# Patient Record
Sex: MALE | Race: WHITE
[De-identification: names, ages, dates, MRNs, and addresses within clinical notes are randomized per-mention and may not be internally consistent; named-entity substitution may affect disease eponyms.]

---

## 2020-11-15 ENCOUNTER — HOSPITAL ENCOUNTER (INPATIENT)
Dept: HOSPITAL 72 - EMR | Age: 40
LOS: 4 days | Discharge: LEFT BEFORE DISCHARGE | DRG: 720 | End: 2020-11-19
Payer: MEDICAID

## 2020-11-15 VITALS — DIASTOLIC BLOOD PRESSURE: 77 MMHG | SYSTOLIC BLOOD PRESSURE: 131 MMHG

## 2020-11-15 VITALS — HEIGHT: 72 IN | WEIGHT: 205 LBS | BODY MASS INDEX: 27.77 KG/M2

## 2020-11-15 VITALS — SYSTOLIC BLOOD PRESSURE: 121 MMHG | DIASTOLIC BLOOD PRESSURE: 79 MMHG

## 2020-11-15 VITALS — SYSTOLIC BLOOD PRESSURE: 127 MMHG | DIASTOLIC BLOOD PRESSURE: 83 MMHG

## 2020-11-15 VITALS — SYSTOLIC BLOOD PRESSURE: 129 MMHG | DIASTOLIC BLOOD PRESSURE: 76 MMHG

## 2020-11-15 VITALS — SYSTOLIC BLOOD PRESSURE: 143 MMHG | DIASTOLIC BLOOD PRESSURE: 85 MMHG

## 2020-11-15 VITALS — DIASTOLIC BLOOD PRESSURE: 81 MMHG | SYSTOLIC BLOOD PRESSURE: 126 MMHG

## 2020-11-15 VITALS — SYSTOLIC BLOOD PRESSURE: 124 MMHG | DIASTOLIC BLOOD PRESSURE: 80 MMHG

## 2020-11-15 DIAGNOSIS — E87.1: ICD-10-CM

## 2020-11-15 DIAGNOSIS — A41.9: Primary | ICD-10-CM

## 2020-11-15 DIAGNOSIS — Z59.0: ICD-10-CM

## 2020-11-15 DIAGNOSIS — L03.313: ICD-10-CM

## 2020-11-15 DIAGNOSIS — L02.11: ICD-10-CM

## 2020-11-15 DIAGNOSIS — F15.10: ICD-10-CM

## 2020-11-15 DIAGNOSIS — L03.114: ICD-10-CM

## 2020-11-15 DIAGNOSIS — J43.9: ICD-10-CM

## 2020-11-15 LAB
ADD MANUAL DIFF: NO
ALBUMIN SERPL-MCNC: 3.5 G/DL (ref 3.4–5)
ALBUMIN/GLOB SERPL: 0.9 {RATIO} (ref 1–2.7)
ALP SERPL-CCNC: 126 U/L (ref 46–116)
ALT SERPL-CCNC: 30 U/L (ref 12–78)
ANION GAP SERPL CALC-SCNC: 7 MMOL/L (ref 5–15)
AST SERPL-CCNC: 17 U/L (ref 15–37)
BASOPHILS NFR BLD AUTO: 0.6 % (ref 0–2)
BILIRUB SERPL-MCNC: 0.2 MG/DL (ref 0.2–1)
BUN SERPL-MCNC: 10 MG/DL (ref 7–18)
CALCIUM SERPL-MCNC: 8.5 MG/DL (ref 8.5–10.1)
CHLORIDE SERPL-SCNC: 99 MMOL/L (ref 98–107)
CK MB SERPL-MCNC: 3.7 NG/ML (ref 0–3.6)
CO2 SERPL-SCNC: 27 MMOL/L (ref 21–32)
CREAT SERPL-MCNC: 0.9 MG/DL (ref 0.55–1.3)
EOSINOPHIL NFR BLD AUTO: 1.5 % (ref 0–3)
ERYTHROCYTE [DISTWIDTH] IN BLOOD BY AUTOMATED COUNT: 12.4 % (ref 11.6–14.8)
GLOBULIN SER-MCNC: 3.9 G/DL
HCT VFR BLD CALC: 44.9 % (ref 42–52)
HGB BLD-MCNC: 15.8 G/DL (ref 14.2–18)
LYMPHOCYTES NFR BLD AUTO: 10.2 % (ref 20–45)
MCV RBC AUTO: 93 FL (ref 80–99)
MONOCYTES NFR BLD AUTO: 7.4 % (ref 1–10)
NEUTROPHILS NFR BLD AUTO: 80.3 % (ref 45–75)
PLATELET # BLD: 315 K/UL (ref 150–450)
POTASSIUM SERPL-SCNC: 3.7 MMOL/L (ref 3.5–5.1)
RBC # BLD AUTO: 4.86 M/UL (ref 4.7–6.1)
SODIUM SERPL-SCNC: 133 MMOL/L (ref 136–145)
WBC # BLD AUTO: 17.8 K/UL (ref 4.8–10.8)

## 2020-11-15 PROCEDURE — 96376 TX/PRO/DX INJ SAME DRUG ADON: CPT

## 2020-11-15 PROCEDURE — 96375 TX/PRO/DX INJ NEW DRUG ADDON: CPT

## 2020-11-15 PROCEDURE — 87081 CULTURE SCREEN ONLY: CPT

## 2020-11-15 PROCEDURE — 80202 ASSAY OF VANCOMYCIN: CPT

## 2020-11-15 PROCEDURE — 80053 COMPREHEN METABOLIC PANEL: CPT

## 2020-11-15 PROCEDURE — 70490 CT SOFT TISSUE NECK W/O DYE: CPT

## 2020-11-15 PROCEDURE — 83735 ASSAY OF MAGNESIUM: CPT

## 2020-11-15 PROCEDURE — 83605 ASSAY OF LACTIC ACID: CPT

## 2020-11-15 PROCEDURE — 71045 X-RAY EXAM CHEST 1 VIEW: CPT

## 2020-11-15 PROCEDURE — 87040 BLOOD CULTURE FOR BACTERIA: CPT

## 2020-11-15 PROCEDURE — 99285 EMERGENCY DEPT VISIT HI MDM: CPT

## 2020-11-15 PROCEDURE — 96365 THER/PROPH/DIAG IV INF INIT: CPT

## 2020-11-15 PROCEDURE — 85025 COMPLETE CBC W/AUTO DIFF WBC: CPT

## 2020-11-15 PROCEDURE — 87181 SC STD AGAR DILUTION PER AGT: CPT

## 2020-11-15 PROCEDURE — 36415 COLL VENOUS BLD VENIPUNCTURE: CPT

## 2020-11-15 PROCEDURE — 87070 CULTURE OTHR SPECIMN AEROBIC: CPT

## 2020-11-15 PROCEDURE — 87205 SMEAR GRAM STAIN: CPT

## 2020-11-15 PROCEDURE — 82553 CREATINE MB FRACTION: CPT

## 2020-11-15 RX ADMIN — HEPARIN SODIUM SCH UNITS: 5000 INJECTION INTRAVENOUS; SUBCUTANEOUS at 08:30

## 2020-11-15 RX ADMIN — HYDROCODONE BITARTRATE AND ACETAMINOPHEN PRN TAB: 10; 325 TABLET ORAL at 21:13

## 2020-11-15 RX ADMIN — HEPARIN SODIUM SCH UNITS: 5000 INJECTION INTRAVENOUS; SUBCUTANEOUS at 21:12

## 2020-11-15 SDOH — ECONOMIC STABILITY - HOUSING INSECURITY: HOMELESSNESS: Z59.0

## 2020-11-15 NOTE — DIAGNOSTIC IMAGING REPORT
EXAM:

  XR Left Hand Complete, 3 or More Views

 

CLINICAL HISTORY:

  PAIN

 

TECHNIQUE:

  Frontal, lateral and oblique views of the left hand.

 

COMPARISON:

  No relevant prior studies available.

 

FINDINGS:

  Bones/joints:  No acute fracture.  No dislocation.

  Soft tissues:  There is soft tissue edema.  No radiopaque foreign body.

 

IMPRESSION:     

1.  No acute fracture.

2.  Soft tissue edema.

## 2020-11-15 NOTE — HISTORY AND PHYSICAL REPORT
DATE OF ADMISSION:  11/15/2020

CHIEF COMPLAINT:  Neck abscess.



HISTORY OF PRESENT ILLNESS:  This is a 40-year-old male with no past

medical history presented with complaints of two weeks of a small abscess

on the upper sternum area.  He denies any fevers or chills.  No shortness

of breath but his infection worsened.  He is apparently homeless, has been

unable to get any medical care until now.



PAST MEDICAL HISTORY:  None.



PAST SURGICAL HISTORY:  None.



CURRENT MEDICATIONS:  None.



FAMILY HISTORY:  None.



SOCIAL HISTORY:  The patient denies any tobacco, ethanol, or drugs.



REVIEW OF SYSTEMS:  Unremarkable.



PHYSICAL EXAMINATION:

VITAL SIGNS:  Temperature 98, pulse 89, respirations 18, and blood pressure

143/104.  There is a 5 cm area of induration and redness on the upper

sternum area.



LABORATORY DATA:  White count was 17,000.



ASSESSMENT:  This is a 40-year-old male admitted with complaints of

cellulitis versus an abscess on the upper sternum.



PLAN:  CT scan of the neck/chest, IV antibiotics, followup cultures.  The

patient may need an I and D.









  ______________________________________________

  Thomas Iraheta M.D.





DR:  [place]

D:  11/15/2020 07:45

T:  11/15/2020 08:31

JOB#:  [id number]

CC:

## 2020-11-15 NOTE — DIAGNOSTIC IMAGING REPORT
EXAM:

  XR Chest, 1 View

 

CLINICAL HISTORY:

  COUGH

 

TECHNIQUE:

  Frontal view of the chest.

 

COMPARISON:

  No relevant prior studies available.

 

FINDINGS:

  Lungs:  Unremarkable.  No consolidation.

  Pleural space:  Unremarkable.  No pneumothorax.

  Heart:  Unremarkable.  No cardiomegaly.

  Mediastinum:  Unremarkable.

  Bones/joints:  Unremarkable.

 

IMPRESSION:     

  No radiographic evidence of acute cardiopulmonary disease.

## 2020-11-15 NOTE — EMERGENCY ROOM REPORT
History of Present Illness


General


Chief Complaint:  General Complaint


Source:  Patient





Present Illness


HPI


40-year-old male, homeless, here for evaluation for skin infections.  Patient 

says that he frequently gets skin infections and admits to using 

methamphetamine.  However over the past several days he has been experiencing 

worsening pain and drainage and redness of the skin around these areas of 

infection.  The worst of the infection are on his chest and on his left hand.  

He is also been experiencing worsening pain and swelling of his left hand.  Says

"I feel sick" which he describes as generalized malaise.  Denies fevers, chills,

shortness of breath, palpitations, back pain, abdominal pain, nausea, vomiting, 

dysuria.  Said he had 1 episode of diarrhea today.  Denies IV drug use.


Allergies:  


Coded Allergies:  


     No Known Allergies (Unverified , 11/15/20)





COVID-19 Screening


Contact w/high risk pt:  No


Experienced COVID-19 symptoms?:  No


COVID-19 Testing performed PTA:  No





Nursing Documentation-[facility]


Past Medical History:  No Stated History





Review of Systems


All Other Systems:  negative except mentioned in HPI





Physical Exam





Vital Signs








  Date Time  Temp Pulse Resp B/P (MAP) Pulse Ox O2 Delivery O2 Flow Rate FiO2


 


11/15/20 01:53 97.9 101 18 126/81 (96) 99 Room Air  








Sp02 EP Interpretation:  reviewed, normal


General Appearance:  no apparent distress, GCS 15, non-toxic, other - 

Disheveled, foul-smelling


Head:  normocephalic, atraumatic


Eyes:  bilateral eye normal inspection, bilateral eye PERRL


ENT:  hearing grossly normal, normal pharynx, no angioedema, normal voice


Neck:  full range of motion, supple/symm/no masses


Respiratory:  chest non-tender, lungs clear, normal breath sounds, speaking full

sentences


Cardiovascular #1:  regular rate, rhythm, no edema


Cardiovascular #2:  2+ carotid (R), 2+ carotid (L), 2+ radial (R), 2+ radial 

(L), 2+ dorsalis pedis (R), 2+ dorsalis pedis (L)


Gastrointestinal:  normal bowel sounds, non tender, soft, non-distended, no 

guarding, no rebound


Rectal:  deferred


Genitourinary:  normal inspection, no CVA tenderness


Musculoskeletal:  back normal, normal range of motion, gait/station normal, 

other - Diffuse swelling of the left hand on the extensor surface of the left 

palm


Neurologic:  alert, motor strength/tone normal, oriented x3, sensory intact, 

responsive, speech normal


Psychiatric:  judgement/insight normal, memory normal, mood/affect normal, no 

suicidal/homicidal ideation


Skin:  other - Multiple open lesions of the extensor surface of the left hand 

and a very large 3 cm open sore on the right upper chest with approximately 4 cm

of extensive warm painful erythema extending from the wound.


Lymphatic:  no adenopathy





Medical Decision Making


Diagnostic Impression:  


   Primary Impression:  


   Sepsis


   Additional Impressions:  


   Cellulitis


   Drug abuse


   Homeless


ER Course





Laboratory Tests








Test


 11/15/20


02:20


 


White Blood Count


 17.8 K/UL


(4.8-10.8)  H


 


Red Blood Count


 4.86 M/UL


(4.70-6.10)


 


Hemoglobin


 15.8 G/DL


(14.2-18.0)


 


Hematocrit


 44.9 %


(42.0-52.0)


 


Mean Corpuscular Volume 93 FL (80-99)  


 


Mean Corpuscular Hemoglobin


 32.5 PG


(27.0-31.0)  H


 


Mean Corpuscular Hemoglobin


Concent 35.1 G/DL


(32.0-36.0)


 


Red Cell Distribution Width


 12.4 %


(11.6-14.8)


 


Platelet Count


 315 K/UL


(150-450)


 


Mean Platelet Volume


 8.0 FL


(6.5-10.1)


 


Neutrophils (%) (Auto)


 80.3 %


(45.0-75.0)  H


 


Lymphocytes (%) (Auto)


 10.2 %


(20.0-45.0)  L


 


Monocytes (%) (Auto)


 7.4 %


(1.0-10.0)


 


Eosinophils (%) (Auto)


 1.5 %


(0.0-3.0)


 


Basophils (%) (Auto)


 0.6 %


(0.0-2.0)


 


Sodium Level


 133 MMOL/L


(136-145)  L


 


Potassium Level


 3.7 MMOL/L


(3.5-5.1)


 


Chloride Level


 99 MMOL/L


()


 


Carbon Dioxide Level


 27 MMOL/L


(21-32)


 


Anion Gap


 7 mmol/L


(5-15)


 


Blood Urea Nitrogen


 10 mg/dL


(7-18)


 


Creatinine


 0.9 MG/DL


(0.55-1.30)


 


Estimated Glomerular


Filtration Rate > 60 mL/min


(>60)


 


Glucose Level


 117 MG/DL


()  H


 


Lactic Acid Level


 1.40 mmol/L


(0.4-2.0)


 


Calcium Level


 8.5 MG/DL


(8.5-10.1)


 


Magnesium Level


 2.0 MG/DL


(1.8-2.4)


 


Total Bilirubin


 0.2 MG/DL


(0.2-1.0)


 


Aspartate Amino Transferase


(AST) 17 U/L (15-37)





 


Alanine Aminotransferase (ALT)


 30 U/L (12-78)





 


Alkaline Phosphatase


 126 U/L


()  H


 


Creatine Kinase MB


 3.7 NG/ML


(0.0-3.6)  H


 


Total Protein


 7.4 G/DL


(6.4-8.2)


 


Albumin


 3.5 G/DL


(3.4-5.0)


 


Globulin 3.9 g/dL  


 


Albumin/Globulin Ratio


 0.9 (1.0-2.7)


L


 


Salicylates Level


 2.5 ug/mL


(2.8-20)  L


 


Acetaminophen Level


 < 2 MCG/ML


(10-30)  L


 


Serum Alcohol < 3 mg/dL  











Chest x-ray: No infiltrate/effusion. Mediastinum within normal limits.  No 

consolidation


X-ray left hand: Soft tissue edema.  No acute bony abnormalities.  No 

osteomyelitis.  No fracture or dislocation





40-year-old male, homeless, history of drug abuse, here with open skin lesions 

and cellulitis.  Patient has cellulitis on physical examination worse in his r

ight upper chest and left hand.  He also had swelling diffusely of his left hand

without any involvement of the digits.  No worry at this time of flexor 

tenosynovitis.  Patient normal range of motion of his fingers and his symptoms 

seem to be localized to the extensor surface of the left palm.  X-ray was 

unremarkable.  No evidence of cellulitis.  Chest x-ray normal.  CBC showed a l

eukocytosis of 18,000.  Patient was also tachycardic on arrival to the emergency

department with heart rate of 101.  He was given 1 L of IV normal saline with 

resolution of his tachycardia.  Lactate negative.  Patient was given vancomycin 

in the emergency department.  Admitted to [facility].





Last Vital Signs








  Date Time  Temp Pulse Resp B/P (MAP) Pulse Ox O2 Delivery O2 Flow Rate FiO2


 


11/15/20 01:53 97.9 101 18 126/81 (96) 99 Room Air  

















Sean Trejo M.D.                Nov 15, 2020 02:16

## 2020-11-16 VITALS — SYSTOLIC BLOOD PRESSURE: 112 MMHG | DIASTOLIC BLOOD PRESSURE: 67 MMHG

## 2020-11-16 VITALS — SYSTOLIC BLOOD PRESSURE: 138 MMHG | DIASTOLIC BLOOD PRESSURE: 73 MMHG

## 2020-11-16 VITALS — DIASTOLIC BLOOD PRESSURE: 78 MMHG | SYSTOLIC BLOOD PRESSURE: 144 MMHG

## 2020-11-16 VITALS — SYSTOLIC BLOOD PRESSURE: 128 MMHG | DIASTOLIC BLOOD PRESSURE: 74 MMHG

## 2020-11-16 VITALS — SYSTOLIC BLOOD PRESSURE: 132 MMHG | DIASTOLIC BLOOD PRESSURE: 79 MMHG

## 2020-11-16 VITALS — DIASTOLIC BLOOD PRESSURE: 83 MMHG | SYSTOLIC BLOOD PRESSURE: 131 MMHG

## 2020-11-16 RX ADMIN — HEPARIN SODIUM SCH UNITS: 5000 INJECTION INTRAVENOUS; SUBCUTANEOUS at 20:59

## 2020-11-16 RX ADMIN — HEPARIN SODIUM SCH UNITS: 5000 INJECTION INTRAVENOUS; SUBCUTANEOUS at 08:46

## 2020-11-16 RX ADMIN — HYDROCODONE BITARTRATE AND ACETAMINOPHEN PRN TAB: 10; 325 TABLET ORAL at 16:59

## 2020-11-16 NOTE — GENERAL PROGRESS NOTE
Subjective


ROS Limited/Unobtainable:  No


Constitutional:  Reports: no symptoms


HEENT:  Reports: no symptoms


Cardiovascular:  Reports: no symptoms


Respiratory:  Reports: no symptoms


Gastrointestinal/Abdominal:  Reports: no symptoms


Genitourinary:  Reports: no symptoms


Neurologic/Psychiatric:  Reports: no symptoms


Endocrine:  Reports: no symptoms


Hematologic/Lymphatic:  Reports: no symptoms


Allergies:  


Coded Allergies:  


     No Known Allergies (Unverified , 11/15/20)


All Systems:  reviewed and negative except above


Subjective


no new complaints. on iv abx. ct not done yet





Objective





Last 24 Hour Vital Signs








  Date Time  Temp Pulse Resp B/P (MAP) Pulse Ox O2 Delivery O2 Flow Rate FiO2


 


11/16/20 09:00      Room Air  


 


11/16/20 08:00 98.1 90 18 144/78 (100) 99   


 


11/16/20 04:00 97.8 79 18 128/74 (92) 98   


 


11/16/20 00:00 97.6 74 19 132/79 (96) 97   


 


11/15/20 21:00      Room Air  


 


11/15/20 20:00 98.4 76 17 124/80 (95) 96   


 


11/15/20 16:00 98.1 79 17 127/83 (98) 98   


 


11/15/20 12:00 97.3 76 18 121/79 (93) 98   

















Intake and Output  


 


 11/15/20 11/16/20





 19:00 07:00


 


Intake Total 600 ml 740 ml


 


Balance 600 ml 740 ml


 


  


 


Intake Oral  740 ml


 


Other 600 ml 


 


# Voids  3








Laboratory Tests


11/15/20 22:55: Vancomycin Level Trough 10.0


Height (Feet):  6


Height (Inches):  0.00


Weight (Pounds):  205


Objective


4cm induration on upper sternum





Assessment/Plan


Problem List:  


(1) Cellulitis


ICD Codes:  L03.90 - Cellulitis, unspecified


SNOMED:  200383378


Status:  stable


Assessment/Plan:


ct pending


iv abx


wound care


pain rx











Thomas Iraheta MD              Nov 16, 2020 11:34

## 2020-11-16 NOTE — DIAGNOSTIC IMAGING REPORT
Indication: Neck swelling and pain

 

Technique: CT of the neck was performed utilizing automated exposure control without

intravenous contrast material. Axial and coronal images were generated.

 

CT dose: Total .6 mGycm; CTDI vol 14.6 mGy

 

Comparison: None available

 

Findings: 

 

Skull base: There is a small left mastoid effusion. Small left maxillary sinus mucus

retention cyst.

 

Airways: Widely patent.

 

Suprahyoid neck: There are calcifications in the palatine tonsils bilaterally, which

are prominent.

 

Infrahyoid neck: Thyroid gland is unremarkable.

 

Upper chest: Lung apices demonstrate mild to moderate emphysema. There is mild

diffuse bronchial thickening.

 

Bones and soft tissues: There are multilevel discogenic degenerative changes of the

visualized spine. In the right anterior supraclavicular soft tissues, there is

moderate focal soft tissue swelling with moderate fat stranding extending to the soft

tissues overlying the right sternoclavicular joint. No evidence of osseous erosion.

 

Impression:

 

1.  Inflammatory changes of the right supraclavicular and parasternal soft tissues

suggestive of cellulitis. No drainable fluid collection identified, though evaluation

is limited in the absence of intravenous contrast.

2.  Prominent, calcified palatine tonsils presumably secondary to prior

infection/inflammation.

3.  Mild to moderate emphysema, incompletely imaged.

4.  Small left mastoid effusion.

 

The CT scanner at [facility] is accredited by the [employer]

[employer]iology and the scans are performed using protocols designed to limit radiation

exposure to as low as reasonably achievable to attain images of sufficient resolution

adequate for diagnostic evaluation.

## 2020-11-17 VITALS — SYSTOLIC BLOOD PRESSURE: 110 MMHG | DIASTOLIC BLOOD PRESSURE: 56 MMHG

## 2020-11-17 VITALS — DIASTOLIC BLOOD PRESSURE: 67 MMHG | SYSTOLIC BLOOD PRESSURE: 122 MMHG

## 2020-11-17 VITALS — DIASTOLIC BLOOD PRESSURE: 70 MMHG | SYSTOLIC BLOOD PRESSURE: 121 MMHG

## 2020-11-17 VITALS — DIASTOLIC BLOOD PRESSURE: 62 MMHG | SYSTOLIC BLOOD PRESSURE: 112 MMHG

## 2020-11-17 VITALS — DIASTOLIC BLOOD PRESSURE: 50 MMHG | SYSTOLIC BLOOD PRESSURE: 96 MMHG

## 2020-11-17 RX ADMIN — HYDROCODONE BITARTRATE AND ACETAMINOPHEN PRN TAB: 10; 325 TABLET ORAL at 08:37

## 2020-11-17 RX ADMIN — HEPARIN SODIUM SCH UNITS: 5000 INJECTION INTRAVENOUS; SUBCUTANEOUS at 21:07

## 2020-11-17 RX ADMIN — HEPARIN SODIUM SCH UNITS: 5000 INJECTION INTRAVENOUS; SUBCUTANEOUS at 08:21

## 2020-11-17 RX ADMIN — HYDROCODONE BITARTRATE AND ACETAMINOPHEN PRN TAB: 10; 325 TABLET ORAL at 12:46

## 2020-11-17 NOTE — CONSULTATION
History of Present Illness


General


Date patient seen:  Nov 17, 2020


Reason for Hospitalization:  General Complaint





Present Illness


HPI


This is a 40-year-old homeless male who presented to [facility] for evaluation of 

worsening skin infections.  Patient says that he frequently gets skin infections

and admits to using methamphetamine.  However over the past several days he has 

been experiencing worsening pain and drainage and redness of the skin around 

these areas of infection.  The worst of the infection are on his chest and on 

his left hand.  He is also been experiencing worsening pain and swelling of his 

left hand.  Says "I feel sick" which he describes as generalized malaise.  

Denies fevers, chills, shortness of breath, palpitations, back pain, abdominal 

pain, nausea, vomiting, dysuria.  Said he had 1 episode of diarrhea today.  

Denies IV drug use.  surgery called to evaluate and assist with care.


Allergies:  


Coded Allergies:  


     No Known Allergies (Unverified , 11/15/20)





COVID-19 Screening


Contact w/high risk pt:  No


Experienced COVID-19 symptoms?:  No





Medication History


No Active Prescriptions or Reported Meds





Patient History


History Provided By:  Patient, Medical Record, PMD


Healthcare decision maker





Resuscitation status





Advanced Directive on File








Past Medical/Surgical History


Past Medical/Surgical History:  


(1) Drug abuse


(2) Sepsis


(3) Homeless


(4) Cellulitis





Review of Systems


Review of Symptoms


General ROS: no weight loss or fever


Psychological ROS: no depression or mood changes, no memory loss


Ophthalmic ROS: no visual changes or eye irritation


ENT ROS: no nasal congestion, hearing loss, dizziness


Allergy and Immunology ROS: no allergic symptoms or urticaria


Hematological and Lymphatic ROS: no swollen glands, unusual bleeding or bruising


Endocrine ROS: no polyuria, polydipsia, weight changes, temperature intolerance


Respiratory ROS: no cough, shortness of breath, or wheezing


Cardiovascular ROS: no chest pain or dyspnea on exertion


Gastrointestinal ROS: denies abdominal pain, bright red blood in stool.


Musculoskeletal ROS: no myalgias or arthralgias


Neurological ROS: no TIA or stroke symptoms


Dermatological ROS: no new or changing skin lesions, rashes or pruritis





Physical Exam


Physical Exam


General appearance:  alert, cooperative, no distress, appears stated age


Head:  Normocephalic, without obvious abnormality, atraumatic


Eyes:  conjunctivae/corneas clear. PERRL, EOM's intact. Fundi benign


Throat:  Lips, mucosa, and tongue normal. Teeth and gums normal


Neck:  supple, symmetrical, trachea midline, no adenopathy, thyroid: not 

enlarged, symmetric, no tenderness/mass/nodules, no carotid bruit and no JVD


Lungs:  clear to auscultation bilaterally


Heart:  regular rate and rhythm, S1, S2 normal, no murmur, click, rub or gallop


Abdomen:  soft, non-tender. Bowel sounds normal. No masses,  no organomegaly


Extremities:  extremities normal, atraumatic, no cyanosis or edema


Pulses:  2+ and symmetric


Skin:  Skin see below 


Neurologic:  Grossly normal





Last 24 Hour Vital Signs








  Date Time  Temp Pulse Resp B/P (MAP) Pulse Ox O2 Delivery O2 Flow Rate FiO2


 


11/17/20 12:00 98.0 77 16 110/56 (74) 22   


 


11/17/20 09:00      Room Air  


 


11/17/20 08:00 98.0 75 16 122/67 (85) 20   


 


11/17/20 04:09 98.0 88 16 121/70 (87) 96   


 


11/16/20 20:01      Room Air  


 


11/16/20 19:46 97.6 90 18 112/67 (82) 94   


 


11/16/20 16:00 97.1 87 18 131/83 (99) 96   

















Intake and Output  


 


 11/16/20 11/17/20





 19:00 07:00


 


Intake Total 1250 ml 770.000 ml


 


Balance 1250 ml 770.000 ml


 


  


 


Intake Oral 650 ml 520 ml


 


IV Total  250.000 ml


 


Other 600 ml 


 


# Voids 3 3








Height (Feet):  6


Height (Inches):  0.00


Weight (Pounds):  205


Medications





Current Medications








 Medications


  (Trade)  Dose


 Ordered  Sig/Tameka


 Route


 PRN Reason  Start Time


 Stop Time Status Last Admin


Dose Admin


 


 Acetaminophen


  (Tylenol)  650 mg  Q4H  PRN


 ORAL


 Mild Pain 1-3/fever  11/15/20 05:45


 12/15/20 05:44   





 


 Acetaminophen/


 Hydrocodone Bitart


  (Norco 10/325)  1 tab  Q4H  PRN


 ORAL


 For Pain 4-10  11/15/20 05:45


 11/22/20 05:44  11/17/20 12:46





 


 Heparin Sodium


  (Porcine)


  (Heparin 5000


 units/ml)  5,000 units  EVERY 12  HOURS


 SUBQ


   11/15/20 09:00


 12/30/20 08:59  11/17/20 08:21





 


 Vancomycin HCl  250 ml @ 


 166.667


 mls/hr  Q8H


 IVPB


   11/15/20 08:00


 11/20/20 07:59  11/17/20 08:20





 


 Vancomycin HCl


  (Vanco pharmacy


 to dose)  1 ea  DAILY  PRN


 MISC


 Per rx protocol  11/15/20 05:45


 12/15/20 05:44   














Assessment/Plan


Problem List:  


(1) Cellulitis


Assessment & Plan:  Patient present on admission with multiple skin concerns.  

He states they have only been there for 2 or 3 days but after further 

conversation and probing he identifies them as potentially being there for over 

2 weeks.  He has a wound on his upper sternum identified as a 3 cm x 2 cm x 0.2 

cm brown scab with slough 100% 0 granulation tissue red edges are raised.  On 

minimal evaluation with gauze dressing nonexcisional debridement the majority of

the sloth was excised.complication.  The remaining wound bed was identified to 

be a hard area of eschar formed as the dermis.  Wound was washed dressings were 

applied.  


Apply Betadine swab followed by gauze or Optifoam daily.  Okay to shower.  


Left hand wound/abscess 2.0x2.5x0.2 brown scab /slough small amount of drainage 

noted and swelling Thera Honey and Optifoam applied.Patient states hand is very 

painful.Left forearm wound/scab2.0x1.8x0.2  no drainage noted Thera Honey and 

Optifoam applied.





no fluid collection or abscess identified


phlegmon mainly


wounds washed


dressings applied


d/c planning


outpatient wound care 


thank you 


oral abx


thank you


ICD Codes:  L03.90 - Cellulitis, unspecified


SNOMED:  606884804


(2) Drug abuse


ICD Codes:  F19.10 - Other psychoactive substance abuse, uncomplicated


SNOMED:  38782194


(3) Sepsis


ICD Codes:  A41.9 - Sepsis, unspecified organism


SNOMED:  90303208


(4) Homeless


ICD Codes:  Z59.0 - Homelessness


SNOMED:  [id number]











Mathew Camacho                Nov 17, 2020 15:29

## 2020-11-17 NOTE — GENERAL PROGRESS NOTE
Subjective


ROS Limited/Unobtainable:  No


Constitutional:  Reports: no symptoms


HEENT:  Reports: no symptoms


Cardiovascular:  Reports: no symptoms


Respiratory:  Reports: no symptoms


Gastrointestinal/Abdominal:  Reports: no symptoms


Genitourinary:  Reports: no symptoms


Neurologic/Psychiatric:  Reports: no symptoms


Endocrine:  Reports: no symptoms


Hematologic/Lymphatic:  Reports: no symptoms


Allergies:  


Coded Allergies:  


     No Known Allergies (Unverified , 11/15/20)


All Systems:  reviewed and negative except above


Subjective


no new complaints. on iv abx. ct shows cellulitis. no abscess





Objective





Last 24 Hour Vital Signs








  Date Time  Temp Pulse Resp B/P (MAP) Pulse Ox O2 Delivery O2 Flow Rate FiO2


 


11/17/20 08:00 98.0 75 16 122/67 (85) 20   


 


11/17/20 04:09 98.0 88 16 121/70 (87) 96   


 


11/16/20 20:01      Room Air  


 


11/16/20 19:46 97.6 90 18 112/67 (82) 94   


 


11/16/20 16:00 97.1 87 18 131/83 (99) 96   


 


11/16/20 12:00 98.5 89 17 138/73 (94) 98   

















Intake and Output  


 


 11/16/20 11/17/20





 19:00 07:00


 


Intake Total 1250 ml 770.000 ml


 


Balance 1250 ml 770.000 ml


 


  


 


Intake Oral 650 ml 520 ml


 


IV Total  250.000 ml


 


Other 600 ml 


 


# Voids 3 3








Height (Feet):  6


Height (Inches):  0.00


Weight (Pounds):  205


Objective


4cm induration on upper sternum





Assessment/Plan


Problem List:  


(1) Cellulitis


ICD Codes:  L03.90 - Cellulitis, unspecified


SNOMED:  [id number]


Status:  stable


Assessment/Plan:


follow up cultures


warm compress


iv abx


wound care


pain rx











Thomas Iraheta MD              Nov 17, 2020 09:51

## 2020-11-18 VITALS — DIASTOLIC BLOOD PRESSURE: 63 MMHG | SYSTOLIC BLOOD PRESSURE: 114 MMHG

## 2020-11-18 VITALS — DIASTOLIC BLOOD PRESSURE: 72 MMHG | SYSTOLIC BLOOD PRESSURE: 139 MMHG

## 2020-11-18 VITALS — SYSTOLIC BLOOD PRESSURE: 116 MMHG | DIASTOLIC BLOOD PRESSURE: 68 MMHG

## 2020-11-18 VITALS — SYSTOLIC BLOOD PRESSURE: 130 MMHG | DIASTOLIC BLOOD PRESSURE: 74 MMHG

## 2020-11-18 VITALS — SYSTOLIC BLOOD PRESSURE: 117 MMHG | DIASTOLIC BLOOD PRESSURE: 62 MMHG

## 2020-11-18 LAB
ADD MANUAL DIFF: NO
ALBUMIN SERPL-MCNC: 3.3 G/DL (ref 3.4–5)
ALBUMIN/GLOB SERPL: 0.7 {RATIO} (ref 1–2.7)
ALP SERPL-CCNC: 119 U/L (ref 46–116)
ALT SERPL-CCNC: 50 U/L (ref 12–78)
ANION GAP SERPL CALC-SCNC: 7 MMOL/L (ref 5–15)
AST SERPL-CCNC: 23 U/L (ref 15–37)
BASOPHILS NFR BLD AUTO: 1.5 % (ref 0–2)
BILIRUB SERPL-MCNC: 0.3 MG/DL (ref 0.2–1)
BUN SERPL-MCNC: 13 MG/DL (ref 7–18)
CALCIUM SERPL-MCNC: 8.8 MG/DL (ref 8.5–10.1)
CHLORIDE SERPL-SCNC: 102 MMOL/L (ref 98–107)
CO2 SERPL-SCNC: 28 MMOL/L (ref 21–32)
CREAT SERPL-MCNC: 0.9 MG/DL (ref 0.55–1.3)
EOSINOPHIL NFR BLD AUTO: 3.7 % (ref 0–3)
ERYTHROCYTE [DISTWIDTH] IN BLOOD BY AUTOMATED COUNT: 12.6 % (ref 11.6–14.8)
GLOBULIN SER-MCNC: 4.5 G/DL
HCT VFR BLD CALC: 51.7 % (ref 42–52)
HGB BLD-MCNC: 16.8 G/DL (ref 14.2–18)
LYMPHOCYTES NFR BLD AUTO: 23.4 % (ref 20–45)
MCV RBC AUTO: 94 FL (ref 80–99)
MONOCYTES NFR BLD AUTO: 7.6 % (ref 1–10)
NEUTROPHILS NFR BLD AUTO: 63.7 % (ref 45–75)
PLATELET # BLD: 338 K/UL (ref 150–450)
POTASSIUM SERPL-SCNC: 4.1 MMOL/L (ref 3.5–5.1)
RBC # BLD AUTO: 5.5 M/UL (ref 4.7–6.1)
SODIUM SERPL-SCNC: 137 MMOL/L (ref 136–145)
WBC # BLD AUTO: 8.2 K/UL (ref 4.8–10.8)

## 2020-11-18 RX ADMIN — HEPARIN SODIUM SCH UNITS: 5000 INJECTION INTRAVENOUS; SUBCUTANEOUS at 08:30

## 2020-11-18 RX ADMIN — HYDROCODONE BITARTRATE AND ACETAMINOPHEN PRN TAB: 10; 325 TABLET ORAL at 12:33

## 2020-11-18 RX ADMIN — HEPARIN SODIUM SCH UNITS: 5000 INJECTION INTRAVENOUS; SUBCUTANEOUS at 20:46

## 2020-11-18 NOTE — GENERAL PROGRESS NOTE
Subjective


ROS Limited/Unobtainable:  Yes


Constitutional:  Reports: malaise, weakness


HEENT:  Reports: no symptoms


Cardiovascular:  Reports: no symptoms


Respiratory:  Reports: no symptoms


Gastrointestinal/Abdominal:  Reports: no symptoms


Genitourinary:  Reports: no symptoms


Neurologic/Psychiatric:  Reports: no symptoms


Endocrine:  Reports: no symptoms


Hematologic/Lymphatic:  Reports: no symptoms


Allergies:  


Coded Allergies:  


     No Known Allergies (Unverified , 11/15/20)


All Systems:  reviewed and negative except above


Subjective


no new complaints. on iv abx. ct shows cellulitis. no abscess


surgery noted.





Objective





Last 24 Hour Vital Signs








  Date Time  Temp Pulse Resp B/P (MAP) Pulse Ox O2 Delivery O2 Flow Rate FiO2


 


11/18/20 04:07 97.8 78 16 116/68 (84) 92   


 


11/17/20 21:00      Room Air  


 


11/17/20 20:18 96.1 85 18 112/62 (79) 97   


 


11/17/20 16:00 98.0 69 16 96/50 (65) 21   


 


11/17/20 12:00 98.0 77 16 110/56 (74) 22   


 


11/17/20 09:00      Room Air  


 


11/17/20 08:00 98.0 75 16 122/67 (85) 20   

















Intake and Output  


 


 11/17/20 11/18/20





 19:00 07:00


 


Intake Total 750 ml 990.000 ml


 


Balance 750 ml 990.000 ml


 


  


 


Intake Oral 750 ml 740 ml


 


IV Total  250.000 ml


 


# Voids 2 3








Height (Feet):  6


Height (Inches):  0.00


Weight (Pounds):  205


Objective


4cm induration on upper sternum





Assessment/Plan


Problem List:  


(1) Cellulitis


ICD Codes:  L03.90 - Cellulitis, unspecified


SNOMED:  [id number]


Status:  stable


Assessment/Plan:


follow up cultures


warm compress


iv abx


wound care


pain rx


dc planning to be determined once cultures back











Thomas Iraheta MD              Nov 18, 2020 07:16

## 2020-11-18 NOTE — SURGERY PROGRESS NOTE
Surgery Progress Note


Subjective


Symptoms:  improved





Objective





Last 24 Hour Vital Signs








  Date Time  Temp Pulse Resp B/P (MAP) Pulse Ox O2 Delivery O2 Flow Rate FiO2


 


11/18/20 12:00 98.1 70 18 114/63 (80) 97   


 


11/18/20 09:00      Room Air  


 


11/18/20 08:00 98.1 73 18 130/74 (92) 97   


 


11/18/20 04:07 97.8 78 16 116/68 (84) 92   


 


11/17/20 21:00      Room Air  


 


11/17/20 20:18 96.1 85 18 112/62 (79) 97   


 


11/17/20 16:00 98.0 69 16 96/50 (65) 21   








I&O











Intake and Output  


 


 11/17/20 11/18/20





 19:00 07:00


 


Intake Total 750 ml 990.000 ml


 


Balance 750 ml 990.000 ml


 


  


 


Intake Oral 750 ml 740 ml


 


IV Total  250.000 ml


 


# Voids 2 3








Dressing:  saturated


Wound:  clean


Cardiovascular:  RSR


Respiratory:  clear


Abdomen:  soft, non-tender, present bowel sounds


Extremities:  no tenderness, no cyanosis





Laboratory Tests








Test


 11/18/20


06:39


 


White Blood Count


 8.2 K/UL


(4.8-10.8)


 


Red Blood Count


 5.50 M/UL


(4.70-6.10)


 


Hemoglobin


 16.8 G/DL


(14.2-18.0)


 


Hematocrit


 51.7 %


(42.0-52.0)


 


Mean Corpuscular Volume 94 FL (80-99)  


 


Mean Corpuscular Hemoglobin


 30.6 PG


(27.0-31.0)


 


Mean Corpuscular Hemoglobin


Concent 32.6 G/DL


(32.0-36.0)


 


Red Cell Distribution Width


 12.6 %


(11.6-14.8)


 


Platelet Count


 338 K/UL


(150-450)


 


Mean Platelet Volume


 7.2 FL


(6.5-10.1)


 


Neutrophils (%) (Auto)


 63.7 %


(45.0-75.0)


 


Lymphocytes (%) (Auto)


 23.4 %


(20.0-45.0)


 


Monocytes (%) (Auto)


 7.6 %


(1.0-10.0)


 


Eosinophils (%) (Auto)


 3.7 %


(0.0-3.0)  H


 


Basophils (%) (Auto)


 1.5 %


(0.0-2.0)


 


Sodium Level


 137 MMOL/L


(136-145)


 


Potassium Level


 4.1 MMOL/L


(3.5-5.1)


 


Chloride Level


 102 MMOL/L


()


 


Carbon Dioxide Level


 28 MMOL/L


(21-32)


 


Anion Gap


 7 mmol/L


(5-15)


 


Blood Urea Nitrogen


 13 mg/dL


(7-18)


 


Creatinine


 0.9 MG/DL


(0.55-1.30)


 


Estimat Glomerular Filtration


Rate > 60 mL/min


(>60)


 


Glucose Level


 90 MG/DL


()


 


Calcium Level


 8.8 MG/DL


(8.5-10.1)


 


Total Bilirubin


 0.3 MG/DL


(0.2-1.0)


 


Aspartate Amino Transf


(AST/SGOT) 23 U/L (15-37)





 


Alanine Aminotransferase


(ALT/SGPT) 50 U/L (12-78)





 


Alkaline Phosphatase


 119 U/L


()  H


 


Total Protein


 7.8 G/DL


(6.4-8.2)


 


Albumin


 3.3 G/DL


(3.4-5.0)  L


 


Globulin 4.5 g/dL  


 


Albumin/Globulin Ratio


 0.7 (1.0-2.7)


L











Plan


Problems:  


(1) Cellulitis


Assessment & Plan:  Patient present on admission with multiple skin concerns.  

He states they have only been there for 2 or 3 days but after further 

conversation and probing he identifies them as potentially being there for over 

2 weeks.  He has a wound on his upper sternum identified as a 3 cm x 2 cm x 0.2 

cm brown scab with slough 100% 0 granulation tissue red edges are raised.  On 

minimal evaluation with gauze dressing nonexcisional debridement the majority of

the sloth was excised.complication.  The remaining wound bed was identified to 

be a hard area of eschar formed as the dermis.  Wound was washed dressings were 

applied.  


Apply Betadine swab followed by gauze or Optifoam daily.  Okay to shower.  


Left hand wound/abscess 2.0x2.5x0.2 brown scab /slough small amount of drainage 

noted and swelling Thera Honey and Optifoam applied.Patient states hand is very 

painful.Left forearm wound/scab2.0x1.8x0.2  no drainage noted Thera Honey and 

Optifoam applied.





no fluid collection or abscess identified


phlegmon mainly


wounds washed


dressings applied


d/c planning


outpatient wound care 


thank you 


oral abx


thank you 





 


Skull base: There is a small left mastoid effusion. Small left maxillary sinus 

mucus


retention cyst.


 


Airways: Widely patent.


 


Suprahyoid neck: There are calcifications in the palatine tonsils bilaterally, 

which


are prominent.


 


Infrahyoid neck: Thyroid gland is unremarkable.


 


Upper chest: Lung apices demonstrate mild to moderate emphysema. There is mild


diffuse bronchial thickening.


 


Bones and soft tissues: There are multilevel discogenic degenerative changes of 

the


visualized spine. In the right anterior supraclavicular soft tissues, there is


moderate focal soft tissue swelling with moderate fat stranding extending to the

soft


tissues overlying the right sternoclavicular joint. No evidence of osseous 

erosion.


 


Impression:


 


1.  Inflammatory changes of the right supraclavicular and parasternal soft 

tissues


suggestive of cellulitis. No drainable fluid collection identified, though 

evaluation


is limited in the absence of intravenous contrast.


2.  Prominent, calcified palatine tonsils presumably secondary to prior


infection/inflammation.


3.  Mild to moderate emphysema, incompletely imaged.


4.  Small left mastoid effusion.





(2) Drug abuse


(3) Sepsis


(4) Homeless











Mathew Camacho                Nov 18, 2020 13:41

## 2020-11-19 ENCOUNTER — HOSPITAL ENCOUNTER (EMERGENCY)
Dept: HOSPITAL 72 - EMR | Age: 40
Discharge: HOME | End: 2020-11-19
Payer: MEDICAID

## 2020-11-19 VITALS — DIASTOLIC BLOOD PRESSURE: 68 MMHG | SYSTOLIC BLOOD PRESSURE: 122 MMHG

## 2020-11-19 VITALS — SYSTOLIC BLOOD PRESSURE: 143 MMHG | DIASTOLIC BLOOD PRESSURE: 76 MMHG

## 2020-11-19 VITALS — HEIGHT: 73 IN | WEIGHT: 185 LBS | BODY MASS INDEX: 24.52 KG/M2

## 2020-11-19 VITALS — SYSTOLIC BLOOD PRESSURE: 121 MMHG | DIASTOLIC BLOOD PRESSURE: 51 MMHG

## 2020-11-19 DIAGNOSIS — F17.200: ICD-10-CM

## 2020-11-19 DIAGNOSIS — Z59.0: ICD-10-CM

## 2020-11-19 DIAGNOSIS — F15.10: ICD-10-CM

## 2020-11-19 DIAGNOSIS — Z76.0: Primary | ICD-10-CM

## 2020-11-19 PROCEDURE — 99282 EMERGENCY DEPT VISIT SF MDM: CPT

## 2020-11-19 RX ADMIN — HYDROCODONE BITARTRATE AND ACETAMINOPHEN PRN TAB: 10; 325 TABLET ORAL at 08:31

## 2020-11-19 RX ADMIN — HEPARIN SODIUM SCH UNITS: 5000 INJECTION INTRAVENOUS; SUBCUTANEOUS at 08:18

## 2020-11-19 SDOH — ECONOMIC STABILITY - HOUSING INSECURITY: HOMELESSNESS: Z59.0

## 2020-11-19 NOTE — NUR
Homeless Discharge:



Patient is being discharged from medical care.  Awake, alert and oriented x3.  
After care instructions, including referral to community resources were given.  
Patient verbalized understanding of After care instructions; at this time 
patient doesn't requier equipment or placement, antibiotics were provided by 
hospital pharmacy  Patient signed patient consent in the medical record for 
patient destination upon discharge.  All medical devices and ID band were 
removed.  Patient ambulated out with all personal belongings with steady gait.

## 2020-11-19 NOTE — CDS PHYSICIAN QUERY
Clarification is required for compliance, coding accuracy, and to reflect 
severity of illness for this patient



Dear Dr. Thomas Iraheta M.D.. Date 11/19/2020

CDI/CDS; Shaan Post

Exercise your independent professional judgment when responding to query. 
Questions asked do not

imply particular answer is desired or expected. We greatly appreciate your 
clarification on this issue.

Clinical Documentation States:

40-year-old male with no past medical history presented with complaints of two 
weeks of a small abscess

on the upper sternum area.  He denies any fevers or chills. [H&P  Thomas Iraheta M.D. 11/15/20 ]



ASSESSMENT:cellulitis versus an abscess on the upper sternum.



Clinical Finding Show:

Vitals (11/15): T98.8F, Pulse 101, RR 18.

LAB (11/15) : Hemat; WBC 1748, Neut%80.3

Chem: Gluc 117, Lactic acid 1.6,

Microbiology (11/17): Wound culture: FEW GRAM POSITIVE COCCI



Medication: Vancomycin  IV, Trimethoprim PO, Acetaminophen PO



According to the clinical indications above, please indicate below the condition

PHYSICIAN RESPONSE:

[ x] Sepsis

[ ] SIRS

[ ] SIRS with organ dysfunction

[ ] Septic Shock

[ ] Not applicable

[ ] Other: _______________________

Present on Admission: [ x Yes [ ] No [ ] Clinically Undetermined



_________________                                    _____________

Physician signature                                       Date



Please also document in your Progress Notes and/or Discharge Summary and 
indicate if the condition was present on admission.

JESSICA

## 2020-11-19 NOTE — EMERGENCY ROOM REPORT
History of Present Illness


General


Chief Complaint:  Medication Refill


Source:  Patient





Present Illness


HPI


Disclaimer: Please note that this report is being documented using DRAGON 

technology. This can lead to erroneous entry secondary to incorrect 

interpretation by the dictating instrument.





HPI: 40-year-old male recently admitted for treatment of chest wall and left 

wrist cellulitis presents requesting antibiotics.  Patient left AGAINST MEDICAL 

ADVICE earlier this morning.  He was receiving IV vancomycin for treatment of 

staph infection.  Abscesses were surgically incised and drained.  Sensitivities 

not yet returned.  Now states he needs antibiotics.  He had left AMA to smoke a 

cigarette and states he does not want to go to the rehabilitation center that 

admitting team was supposedly arranging.  Patient uses methamphetamines.  Denies

fever, chills, vomiting or other symptoms at this time.


 


PMH: Substance abuse, cellulitis and abscess


 


PSH: Abscess incision and drainage


 


Allergies: Reviewed


 


Social Hx: Substance abuse


Allergies:  


Coded Allergies:  


     No Known Allergies (Unverified , 11/15/20)





COVID-19 Screening


Contact w/high risk pt:  No


Experienced COVID-19 symptoms?:  No


COVID-19 Testing performed PTA:  No





Nursing Documentation-PMH


Past Medical History:  No Stated History





Review of Systems


All Other Systems:  negative except mentioned in HPI





Physical Exam





Vital Signs








  Date Time  Temp Pulse Resp B/P (MAP) Pulse Ox O2 Delivery O2 Flow Rate FiO2


 


11/19/20 11:37 98.2 98 20 143/76 (98) 96 Room Air  





 





General: Awake and alert, no acute distress


HEENT: NC/AT. EOMI. 


Resp: Normal work of breathing


Skin: Surgical bandages applied to chest wall and left leg stone clean dry and 

intact.  No active purulent draining or bleeding from wounds.


MSK: Normal tone and bulk. Moving all extremities.  No obvious deformity.


Neuro: Awake and alert.  Mentating appropriately





Medical Decision Making


Homeless Attestation


Patient is homelss. Patient has been medically screened and is stable for 

outpatient follow up


Diagnostic Impression:  


   Primary Impression:  


   Encounter for medication refill


ER Course


40-year-old male presents requesting antibiotics.  Patient left AGAINST MEDICAL 

ADVICE while being treated for chest wall and left wrist staph infections.  He 

had incision and drainage of the sites and was receiving vancomycin.  

Sensitivities are not yet resulted on the wound cultures.  Patient no longer 

wants to be in the hospital does not want to go to the rehabilitation center 

after discharge.  He is homeless.  Will start on Bactrim.  Encouraged to follow-

up with outpatient wound care.  Attempted to send to drug rehab facility however

the patient declined.  States he is going to go stay with his wife.  Advised to 

return with new or worsening symptoms.





Last Vital Signs








  Date Time  Temp Pulse Resp B/P (MAP) Pulse Ox O2 Delivery O2 Flow Rate FiO2


 


11/19/20 11:37 98.2 98 20 143/76 (98) 96 Room Air  








Disposition:  HOME, SELF-CARE


Condition:  Stable


Scripts


Trimethoprim/Sulfamethoxazole 160/800* (BACTRIM DS TABLET*) 1 Each Tablet


1 TAB ORAL Q12H for 7 Days, #14 TAB 0 Refills


   Prov: Yuniel Mejia MD         11/19/20


Referrals:  


[facility]











[facility]











[facility]











[facility]





Psych ER - [phone]


 Peds ER - [phone]





[facility]





Intake Hotline - [phone]





[facility] - [phone]





[facility]


Patient Instructions:  Medicine Refill at the Emergency Department





Additional Instructions:  


Please follow-up with your primary care doctor in the next 1 to 3 days to 

discuss this emergency department visit and for reevaluation.  If you have any 

new or worsening symptoms please return to the emergency department for 

reevaluation.  





Please note that this report is being documented using DRAGON technology.


This can lead to erroneous entry secondary to incorrect interpretation by the 

dictating instrument.











Yuniel Mejia MD              Nov 19, 2020 11:55

## 2020-11-19 NOTE — NUR
ED Nurse Note:pt. came for antibiotics to treat skin infection, seen by ER MD, 
supply of meds were provided by hospital [facility] due to pt. transient status

## 2020-11-19 NOTE — SURGERY PROGRESS NOTE
Surgery Progress Note


Subjective


Additional Comments


seemed nervous this am


states hasnt used drugs in a few days and does nt want to


late entry.


has since eloped





Objective





Last 24 Hour Vital Signs








  Date Time  Temp Pulse Resp B/P (MAP) Pulse Ox O2 Delivery O2 Flow Rate FiO2


 


11/19/20 09:00      Room Air  


 


11/19/20 07:50 96.4 77 18 121/51 (74) 100   


 


11/19/20 04:00 97.8 76 16 122/68 (86) 94   


 


11/18/20 20:28      Room Air  


 


11/18/20 19:49 98.1 80 16 139/72 (94) 97   


 


11/18/20 16:00 97.6 76 18 117/62 (80) 98   








I&O











Intake and Output  


 


 11/18/20 11/19/20





 19:00 07:00


 


Intake Total 1506.668 ml 1050.000 ml


 


Balance 1506.668 ml 1050.000 ml


 


  


 


Intake Oral 840 ml 800 ml


 


IV Total 666.668 ml 250.000 ml


 


# Voids 3 4








Dressing:  dry


Cardiovascular:  RSR


Respiratory:  clear


Abdomen:  soft, non-tender, present bowel sounds


Extremities:  no edema, no tenderness, no cyanosis





Plan


Problems:  


(1) Cellulitis


Assessment & Plan:  Patient present on admission with multiple skin concerns.  

He states they have only been there for 2 or 3 days but after further 

conversation and probing he identifies them as potentially being there for over 

2 weeks.  He has a wound on his upper sternum identified as a 3 cm x 2 cm x 0.2 

cm brown scab with slough 100% 0 granulation tissue red edges are raised.  On 

minimal evaluation with gauze dressing nonexcisional debridement the majority of

the sloth was excised.complication.  The remaining wound bed was identified to 

be a hard area of eschar formed as the dermis.  Wound was washed dressings were 

applied.  


Apply Betadine swab followed by gauze or Optifoam daily.  Okay to shower.  


Left hand wound/abscess 2.0x2.5x0.2 brown scab /slough small amount of drainage 

noted and swelling Thera Honey and Optifoam applied.Patient states hand is very 

painful.Left forearm wound/scab2.0x1.8x0.2  no drainage noted Thera Honey and 

Optifoam applied.





no fluid collection or abscess identified


phlegmon mainly


wounds washed


dressings applied


d/c planning


outpatient wound care 


thank you 


oral abx


thank you 





 


Skull base: There is a small left mastoid effusion. Small left maxillary sinus 

mucus


retention cyst.


 


Airways: Widely patent.


 


Suprahyoid neck: There are calcifications in the palatine tonsils bilaterally, 

which


are prominent.


 


Infrahyoid neck: Thyroid gland is unremarkable.


 


Upper chest: Lung apices demonstrate mild to moderate emphysema. There is mild


diffuse bronchial thickening.


 


Bones and soft tissues: There are multilevel discogenic degenerative changes of 

the


visualized spine. In the right anterior supraclavicular soft tissues, there is


moderate focal soft tissue swelling with moderate fat stranding extending to the

soft


tissues overlying the right sternoclavicular joint. No evidence of osseous 

erosion.


 


Impression:


 


1.  Inflammatory changes of the right supraclavicular and parasternal soft 

tissues


suggestive of cellulitis. No drainable fluid collection identified, though 

evaluation


is limited in the absence of intravenous contrast.


2.  Prominent, calcified palatine tonsils presumably secondary to prior


infection/inflammation.


3.  Mild to moderate emphysema, incompletely imaged.


4.  Small left mastoid effusion.





(2) Drug abuse


(3) Sepsis


(4) Homeless











Mathew Camacho                Nov 19, 2020 13:01

## 2020-11-23 NOTE — DISCHARGE SUMMARY
Discharge Summary


Discharge Summary


_


DATE OF ADMISSION: 11/15/2020





DATE OF DISCHARGE: 11/19/2020








Patient left AGAINST MEDICAL ADVICE





REASON FOR ADMISSION: 


50 years old male, homeless, with no significant past medical history, presented

for evaluation of skin infection for 2 weeks.  


Patient admits to use of methamphetamine.  


Patient reported that he frequently gets skin infections.  


Over the past several days he had been experience worsening pain and drainage 

along  redness on the skin around the areas of infection,  including chest and 

left hand.  





He denied fever and chills.  


He denied chest pain or shortness of breath.  No palpitations.  


No back pain or abdominal pain.  


No nausea,  vomiting or dysuria.  


He reported one episode of diarrhea.  


Upon evaluation vital signs were stable.  


Laboratory work-up revealed leukocytosis WBC 17., stable hemoglobin , hematocrit

and platelet count.


Sodium 133. stable other electrolytes and renal parameters. 


Glucose 117.  


Lactic acid 1.4.  


Stable LFT.  


Serum alcohol less than 3 .


Chest x-ray revealed no acute cardiopulmonary pathology.  


X-ray of the left hand revealed soft tissue edema , no acute bony abnormality , 

no evidence of osteomyelitis ,no fracture or dislocation.   


Patient received liter of fluid , started on empiric antibiotic and admitted to 

medical surgical floor for further management.





CON[facility]


 


 


[facility] COURSE: 


Patient admitted to medical surgical floor.  


Patient undergone CT scan of the neck , which revealed inflammatory changes 

suggestive of cellulitis.  No drainable fluid collection.  Mild to moderate 

emphysema.


Patient continued on broad-spectrum antibiotic blood culture were negative


Rapid COVID-19 was negative.


Wound culture revealed MRSA.  


Vancomycin continued.


Leukocytosis resolved.


Pain management was addressed as needed.


Supportive care provided.


Patient was counseled on abstinence from illicit street drugs.  


DVT prophylaxis provided.  


Surgeon followed.  Wound care provided as per surgeon recommendation.  


No fluid collection or abscess were identified.  


Vancomycin changed to Bactrim.


Plan was for discharge patient home with  oral antibiotics and wound care.


Patient decided to leave AGAINST MEDICAL ADVICE.  


The risks and consequences of signing AGAINST MEDICAL ADVICE were discussed with

patient in detail.  


Patient verbalized understanding, nevertheless refused to sign AMA form and 

eloped on 11/19.





FINAL DIAGNOSES: 


Sepsis


Cellulitis


Drug abuse


Homeless


 





I have been assigned to dictate discharge summary for this account. 


I was not involved in the patient's management.











Kim Mancia NP                Nov 23, 2020 13:47

## 2021-01-11 ENCOUNTER — HOSPITAL ENCOUNTER (EMERGENCY)
Dept: HOSPITAL 72 - EMR | Age: 41
LOS: 1 days | Discharge: HOME | End: 2021-01-12
Payer: MEDICAID

## 2021-01-11 VITALS — SYSTOLIC BLOOD PRESSURE: 130 MMHG | DIASTOLIC BLOOD PRESSURE: 80 MMHG

## 2021-01-11 VITALS — HEIGHT: 72 IN | BODY MASS INDEX: 25.73 KG/M2 | WEIGHT: 190 LBS

## 2021-01-11 VITALS — SYSTOLIC BLOOD PRESSURE: 128 MMHG | DIASTOLIC BLOOD PRESSURE: 78 MMHG

## 2021-01-11 DIAGNOSIS — F12.10: ICD-10-CM

## 2021-01-11 DIAGNOSIS — Z59.0: ICD-10-CM

## 2021-01-11 DIAGNOSIS — M54.2: ICD-10-CM

## 2021-01-11 DIAGNOSIS — F15.10: ICD-10-CM

## 2021-01-11 DIAGNOSIS — L03.313: ICD-10-CM

## 2021-01-11 DIAGNOSIS — L02.11: ICD-10-CM

## 2021-01-11 DIAGNOSIS — L02.213: Primary | ICD-10-CM

## 2021-01-11 DIAGNOSIS — L03.221: ICD-10-CM

## 2021-01-11 LAB
ADD MANUAL DIFF: NO
ALBUMIN SERPL-MCNC: 3.3 G/DL (ref 3.4–5)
ALBUMIN/GLOB SERPL: 0.7 {RATIO} (ref 1–2.7)
ALP SERPL-CCNC: 151 U/L (ref 46–116)
ALT SERPL-CCNC: 63 U/L (ref 12–78)
ANION GAP SERPL CALC-SCNC: 8 MMOL/L (ref 5–15)
APPEARANCE UR: CLEAR
APTT BLD: 30 SEC (ref 23–33)
APTT PPP: YELLOW S
AST SERPL-CCNC: 68 U/L (ref 15–37)
BASOPHILS NFR BLD AUTO: 1 % (ref 0–2)
BILIRUB SERPL-MCNC: 0.6 MG/DL (ref 0.2–1)
BUN SERPL-MCNC: 15 MG/DL (ref 7–18)
CALCIUM SERPL-MCNC: 8.7 MG/DL (ref 8.5–10.1)
CHLORIDE SERPL-SCNC: 101 MMOL/L (ref 98–107)
CK SERPL-CCNC: 356 U/L (ref 26–308)
CO2 SERPL-SCNC: 26 MMOL/L (ref 21–32)
CREAT SERPL-MCNC: 0.9 MG/DL (ref 0.55–1.3)
EOSINOPHIL NFR BLD AUTO: 1.3 % (ref 0–3)
ERYTHROCYTE [DISTWIDTH] IN BLOOD BY AUTOMATED COUNT: 13.3 % (ref 11.6–14.8)
FERRITIN SERPL-MCNC: 104 NG/ML (ref 8–388)
GLOBULIN SER-MCNC: 4.8 G/DL
GLUCOSE UR STRIP-MCNC: NEGATIVE MG/DL
HCT VFR BLD CALC: 44.2 % (ref 42–52)
HGB BLD-MCNC: 14.9 G/DL (ref 14.2–18)
INR PPP: 0.9 (ref 0.9–1.1)
KETONES UR QL STRIP: NEGATIVE
LDH SERPL L TO P-CCNC: 168 U/L (ref 81–234)
LEUKOCYTE ESTERASE UR QL STRIP: NEGATIVE
LYMPHOCYTES NFR BLD AUTO: 11.1 % (ref 20–45)
MCV RBC AUTO: 92 FL (ref 80–99)
MONOCYTES NFR BLD AUTO: 6.9 % (ref 1–10)
NEUTROPHILS NFR BLD AUTO: 79.6 % (ref 45–75)
NITRITE UR QL STRIP: NEGATIVE
PH UR STRIP: 5 [PH] (ref 4.5–8)
PHOSPHATE SERPL-MCNC: 4.4 MG/DL (ref 2.5–4.9)
PLATELET # BLD: 368 K/UL (ref 150–450)
POTASSIUM SERPL-SCNC: 5.6 MMOL/L (ref 3.5–5.1)
PROT UR QL STRIP: (no result)
RBC # BLD AUTO: 4.82 M/UL (ref 4.7–6.1)
SODIUM SERPL-SCNC: 135 MMOL/L (ref 136–145)
SP GR UR STRIP: 1.02 (ref 1–1.03)
UROBILINOGEN UR-MCNC: NORMAL MG/DL (ref 0–1)
WBC # BLD AUTO: 16.6 K/UL (ref 4.8–10.8)

## 2021-01-11 PROCEDURE — 83615 LACTATE (LD) (LDH) ENZYME: CPT

## 2021-01-11 PROCEDURE — 85730 THROMBOPLASTIN TIME PARTIAL: CPT

## 2021-01-11 PROCEDURE — 80307 DRUG TEST PRSMV CHEM ANLYZR: CPT

## 2021-01-11 PROCEDURE — 84100 ASSAY OF PHOSPHORUS: CPT

## 2021-01-11 PROCEDURE — 36415 COLL VENOUS BLD VENIPUNCTURE: CPT

## 2021-01-11 PROCEDURE — 81003 URINALYSIS AUTO W/O SCOPE: CPT

## 2021-01-11 PROCEDURE — 86140 C-REACTIVE PROTEIN: CPT

## 2021-01-11 PROCEDURE — 85025 COMPLETE CBC W/AUTO DIFF WBC: CPT

## 2021-01-11 PROCEDURE — 85379 FIBRIN DEGRADATION QUANT: CPT

## 2021-01-11 PROCEDURE — 71045 X-RAY EXAM CHEST 1 VIEW: CPT

## 2021-01-11 PROCEDURE — 83735 ASSAY OF MAGNESIUM: CPT

## 2021-01-11 PROCEDURE — 83605 ASSAY OF LACTIC ACID: CPT

## 2021-01-11 PROCEDURE — 96365 THER/PROPH/DIAG IV INF INIT: CPT

## 2021-01-11 PROCEDURE — 96375 TX/PRO/DX INJ NEW DRUG ADDON: CPT

## 2021-01-11 PROCEDURE — 87040 BLOOD CULTURE FOR BACTERIA: CPT

## 2021-01-11 PROCEDURE — 87181 SC STD AGAR DILUTION PER AGT: CPT

## 2021-01-11 PROCEDURE — 84484 ASSAY OF TROPONIN QUANT: CPT

## 2021-01-11 PROCEDURE — 85610 PROTHROMBIN TIME: CPT

## 2021-01-11 PROCEDURE — 93005 ELECTROCARDIOGRAM TRACING: CPT

## 2021-01-11 PROCEDURE — 99291 CRITICAL CARE FIRST HOUR: CPT

## 2021-01-11 PROCEDURE — 96368 THER/DIAG CONCURRENT INF: CPT

## 2021-01-11 PROCEDURE — 82728 ASSAY OF FERRITIN: CPT

## 2021-01-11 PROCEDURE — 80053 COMPREHEN METABOLIC PANEL: CPT

## 2021-01-11 PROCEDURE — 82550 ASSAY OF CK (CPK): CPT

## 2021-01-11 SDOH — ECONOMIC STABILITY - HOUSING INSECURITY: HOMELESSNESS: Z59.0

## 2021-01-11 NOTE — EMERGENCY ROOM REPORT
History of Present Illness


General


Chief Complaint:  Skin Rash/Abscess


Source:  Patient





Present Illness


HPI


Patient is a 40-year-old male PMHx meth, non compliance, emphysema, presents 

with multiple complaints.


First complaint is recurrent left neck and anterior chest abscess. Patient was 

admitted in Nov for sepsis 2/2 abscess and cellulitis. 


Symptoms started 3 weeks ago, patient was placed on doxycycline for 10 days, 

symptoms improved, however when he ran out of doxycycline, patient states that 

the abscesses got worse.  He denies injection drug use, headache, neck rigidity,

photophobia, chills, night sweats, midline back pain, urinary retention, bowel 

or bladder incontinence, petechial rash, chest pain, hemoptysis, shortness of 

breath or other symptoms.


He has not been tested recently for Covid.  He is complaining of nasal 

congestion, sore throat, and dry cough.


Tdap is up-to-date, last injection was 2 years ago


The patient's symptoms were gradual onset, severity was moderate, duration since

5 days.  


Quality: Aching, swelling








Past medical history:  Denies


Past surgical history: Right hand surgery








Smoking:  ++


Alcohol use:  Denies


Drug use:  Meth 





Review of systems:


CONST: No fevers or chills, No night sweats


PULMONARY: No productive cough,  No shortness of breath 


CARDIAC: No chest pain, No palpitations 


GI: No vomiting, No diarrhea , No melena_or_BRBPR 


: No dysuria, No hematuria, No discharge 


NEURO: No new_focal_weakness_or_numbness, No confusion, No vision changes


14 point Review of Systems is otherwise negative except per HPI





Physical Exam:


GENERAL: Awake_alert_ nontoxic, no acute distress Spo2 90% on RA -normal


EYES: Extraocular muscles are intact. Conjunctivae clear. Lids without swelling


ENT: External nose and ear normal_in_appearance. Oropharynx clear. 

Head_atraumatic, Moist_oral_mucosa; No stridor, no drooling, no hoarse voice 


NECK:  No JVD. No meningismus. No thyromegaly.  Supple. Trachea midline


RESP: No chest wall crepitus. Normal respiratory effort. Symmetric rise. No 

stridor. Clear_to_auscultation_No_rales_No_wheezes. Speaks in full sentences


CARDIAC: Tachycardic and regular rhytm. No_significant pedal edema.


ABDOMEN: Soft. Nondistended.  Nontender_No_rebound_or_guarding.


MSK:  Normal muscle tone, without rigidity.  Extremities without asymmetric 

deformity or swelling.  


SKIN: Multiple indurated abscess anterior to trachea, R chest. Non fluctuant. No

overlying crepitus. No subcutaneous emphysema. 


NEUROLOGIC: Alert, oriented x3.  Motor_and_sensation_grossly_intact. No truncal 

ataxia. Gait_normal


Psych: Normal mood and affect, normal judgment and insight











- COORDINATION OF CARE


Case was discussed with: Patient  





Any labs and imaging that were ordered were interpreted as part of the medical 

decision making:








Medical Decision Making/Plan:





Differential includes pneumonia, bronchitis, CHF, pulmonary edema, pulmonary 

embolism, mediastinal mass, abscess, pleural effusion among others.   





Patient is afebrile with no nuchal rigidity or petechia. No headache or 

photophobia. Unlikely meningitis or encephalitis. 


Patient has multiple indurated abscesses at the anterior chest and anterior to 

the trachea. No signs of edvin angina. 


Initial SPO2 was 90% and patient was tachycardic. 


Will initiate septic workup with IV ABx, NS 30cc/kg bolus. 


Will give vanco/zosyn, pain control.


Tdap is UTD. 


I reviewed chart. Patient was admitted to hospital for sepsis 2/2 cellulitis in 

Nov 2020. Wound culture grew MRSA sensitive to vanco and bactrim. 





Care to be signed out pending CT imaging of chest/neck


Allergies:  


Coded Allergies:  


     No Known Allergies (Unverified , 11/15/20)





COVID-19 Screening


Contact w/high risk pt:  No


Experienced COVID-19 symptoms?:  No


COVID-19 Testing performed PTA:  No





Nursing Documentation-[facility]


Past Medical History:  No Stated History





Physical Exam





Vital Signs








  Date Time  Temp Pulse Resp B/P (MAP) Pulse Ox O2 Delivery O2 Flow Rate FiO2


 


1/11/21 20:53 99.0 92 18 130/80 (97) 90 Room Air  








Sp02 EP Interpretation:  reviewed, normal





Procedures


Critical Care Time


Critical Care Time


Critical Care Statement


Organ systems at risk include: [cardiac / circulatory]


Critical care performed for  45  minutes. Time is exclusive of separately 

billable procedures. 


Time includes: direct patient care, continuous monitoring and multiple patient 

reassessment, coordination of patient care, review of patient's medical records,

medical consultation, family consultation regarding treatment decisions and 

documentation of patient care.





Medical Decision Making


Diagnostic Impression:  


   Primary Impression:  


   Abscess


   Additional Impressions:  


   Cellulitis


   Drug abuse


   Homeless


   Neck pain





Last Vital Signs








  Date Time  Temp Pulse Resp B/P (MAP) Pulse Ox O2 Delivery O2 Flow Rate FiO2


 


1/11/21 20:53 99.0 92 18 130/80 (97) 90 Room Air  








Admit Decision Time:  22:00


Condition:  Stable


Signed Out To:  DR DIETZ pending labs, CT


Referrals:  


NOT CHOSEN IPA/MD,REFERRING (PCP)











Shayy Carlos D.O.           Jan 11, 2021 21:29

## 2021-01-11 NOTE — EMERGENCY ROOM REPORT
History of Present Illness


General


Chief Complaint:  To Be Triaged





Present Illness


HPI


Patient eloped prior to medical evaluation


Allergies:  


Coded Allergies:  


     No Known Allergies (Unverified , 11/15/20)





COVID-19 Screening


Contact w/high risk pt:  No


Experienced COVID-19 symptoms?:  No





Physical Exam


Sp02 EP Interpretation:  reviewed, normal





Medical Decision Making


Diagnostic Impression:  


   Primary Impression:  


   Neck pain


Disposition:  ELOPED


Admit Decision Time:  20:46


Condition:  Unknown


Referrals:  


NOT CHOSEN IPA/MD,REFERRING (PCP)











Shayy Carlos D.O.           Jan 11, 2021 20:46

## 2021-01-11 NOTE — NUR
ED Nurse Note:



pt initially LWBS but returned, he presents to ED with two large abscesses, one 
on neck and one on R colar bone. they are open and draining with redness and 
induration around wound edges. pt reports that he has been seen and treated 
here several times for abscesses. pt denies any drug use, reports that the 
abscess usually starts as a small pimple but grows. pt is also c/o cough , sore 
throat and congestion; he denies coming into contact with anyone who has tested 
(+) for COVID. recheck of pt's O2 saturation in room was 98% on RA

## 2021-01-12 VITALS — DIASTOLIC BLOOD PRESSURE: 79 MMHG | SYSTOLIC BLOOD PRESSURE: 123 MMHG

## 2021-01-12 VITALS — SYSTOLIC BLOOD PRESSURE: 129 MMHG | DIASTOLIC BLOOD PRESSURE: 82 MMHG

## 2021-01-12 NOTE — NUR
ED Nurse Note:

pt is sleeping on the bed. vitals signs are stable and no distress. We will 
keep monitoring the pt .

## 2021-01-12 NOTE — DIAGNOSTIC IMAGING REPORT
EXAM:

  XR Chest, 1 View

 

CLINICAL HISTORY:

  COUGH

 

TECHNIQUE:

  Frontal view of the chest.

 

COMPARISON:

  Chest radiograph dated 11/15/2020.

 

FINDINGS:

  Limitations:  Mild Limited evaluation due to the left costophrenic 

angle not entirely within the field-of-view.

  Lungs:  Unremarkable.  No consolidation.

  Pleural space: Unremarkable.

  Heart:  Unremarkable.  No cardiomegaly.

  Mediastinum:  Unremarkable.

  Bones/joints:  Unremarkable.

 

IMPRESSION:     

  No acute findings in the chest.

## 2021-01-12 NOTE — CARDIOLOGY REPORT
--------------- APPROVED REPORT --------------





EKG Measurement

Heart [id number]

NV [id number]

[id number]

[id number]

[id number]



<Conclusion>

Sinus tachycardia

Possible Left atrial enlargement

Borderline ECG

## 2021-01-13 ENCOUNTER — HOSPITAL ENCOUNTER (INPATIENT)
Dept: HOSPITAL 72 - EMR | Age: 41
LOS: 4 days | Discharge: LEFT BEFORE DISCHARGE | DRG: 720 | End: 2021-01-17
Payer: MEDICAID

## 2021-01-13 VITALS — WEIGHT: 185 LBS | BODY MASS INDEX: 25.06 KG/M2 | HEIGHT: 72 IN

## 2021-01-13 VITALS — SYSTOLIC BLOOD PRESSURE: 128 MMHG | DIASTOLIC BLOOD PRESSURE: 69 MMHG

## 2021-01-13 DIAGNOSIS — L02.11: ICD-10-CM

## 2021-01-13 DIAGNOSIS — Z59.0: ICD-10-CM

## 2021-01-13 DIAGNOSIS — F17.200: ICD-10-CM

## 2021-01-13 DIAGNOSIS — E75.29: ICD-10-CM

## 2021-01-13 DIAGNOSIS — A41.89: Primary | ICD-10-CM

## 2021-01-13 DIAGNOSIS — L02.213: ICD-10-CM

## 2021-01-13 DIAGNOSIS — F19.10: ICD-10-CM

## 2021-01-13 DIAGNOSIS — Z86.14: ICD-10-CM

## 2021-01-13 DIAGNOSIS — L03.221: ICD-10-CM

## 2021-01-13 PROCEDURE — 85025 COMPLETE CBC W/AUTO DIFF WBC: CPT

## 2021-01-13 PROCEDURE — 85651 RBC SED RATE NONAUTOMATED: CPT

## 2021-01-13 PROCEDURE — 87070 CULTURE OTHR SPECIMN AEROBIC: CPT

## 2021-01-13 PROCEDURE — 96366 THER/PROPH/DIAG IV INF ADDON: CPT

## 2021-01-13 PROCEDURE — 86703 HIV-1/HIV-2 1 RESULT ANTBDY: CPT

## 2021-01-13 PROCEDURE — 96368 THER/DIAG CONCURRENT INF: CPT

## 2021-01-13 PROCEDURE — 70490 CT SOFT TISSUE NECK W/O DYE: CPT

## 2021-01-13 PROCEDURE — 96365 THER/PROPH/DIAG IV INF INIT: CPT

## 2021-01-13 PROCEDURE — 36415 COLL VENOUS BLD VENIPUNCTURE: CPT

## 2021-01-13 PROCEDURE — 80202 ASSAY OF VANCOMYCIN: CPT

## 2021-01-13 PROCEDURE — 80053 COMPREHEN METABOLIC PANEL: CPT

## 2021-01-13 PROCEDURE — 86140 C-REACTIVE PROTEIN: CPT

## 2021-01-13 PROCEDURE — 87205 SMEAR GRAM STAIN: CPT

## 2021-01-13 PROCEDURE — 80048 BASIC METABOLIC PNL TOTAL CA: CPT

## 2021-01-13 PROCEDURE — 99285 EMERGENCY DEPT VISIT HI MDM: CPT

## 2021-01-13 PROCEDURE — 87181 SC STD AGAR DILUTION PER AGT: CPT

## 2021-01-13 SDOH — ECONOMIC STABILITY - HOUSING INSECURITY: HOMELESSNESS: Z59.0

## 2021-01-13 NOTE — NUR
ED Nurse Note:

pt back to the ed due to positive blood culture test .pt has abscess on his 
neck that is infected. pt has no fever, and vitals are stable. Pt A7Ox4, verbal 
and ambulatory.

## 2021-01-14 VITALS — SYSTOLIC BLOOD PRESSURE: 133 MMHG | DIASTOLIC BLOOD PRESSURE: 76 MMHG

## 2021-01-14 VITALS — DIASTOLIC BLOOD PRESSURE: 74 MMHG | SYSTOLIC BLOOD PRESSURE: 131 MMHG

## 2021-01-14 VITALS — DIASTOLIC BLOOD PRESSURE: 76 MMHG | SYSTOLIC BLOOD PRESSURE: 129 MMHG

## 2021-01-14 VITALS — SYSTOLIC BLOOD PRESSURE: 121 MMHG | DIASTOLIC BLOOD PRESSURE: 74 MMHG

## 2021-01-14 LAB
ADD MANUAL DIFF: NO
ANION GAP SERPL CALC-SCNC: 9 MMOL/L (ref 5–15)
BASOPHILS NFR BLD AUTO: 1.4 % (ref 0–2)
BUN SERPL-MCNC: 18 MG/DL (ref 7–18)
CALCIUM SERPL-MCNC: 9 MG/DL (ref 8.5–10.1)
CHLORIDE SERPL-SCNC: 104 MMOL/L (ref 98–107)
CO2 SERPL-SCNC: 27 MMOL/L (ref 21–32)
CREAT SERPL-MCNC: 0.8 MG/DL (ref 0.55–1.3)
EOSINOPHIL NFR BLD AUTO: 1.2 % (ref 0–3)
ERYTHROCYTE [DISTWIDTH] IN BLOOD BY AUTOMATED COUNT: 12.9 % (ref 11.6–14.8)
HCT VFR BLD CALC: 40.5 % (ref 42–52)
HGB BLD-MCNC: 13.6 G/DL (ref 14.2–18)
LYMPHOCYTES NFR BLD AUTO: 28.1 % (ref 20–45)
MCV RBC AUTO: 89 FL (ref 80–99)
MONOCYTES NFR BLD AUTO: 7.8 % (ref 1–10)
NEUTROPHILS NFR BLD AUTO: 61.6 % (ref 45–75)
PLATELET # BLD: 380 K/UL (ref 150–450)
POTASSIUM SERPL-SCNC: 3.7 MMOL/L (ref 3.5–5.1)
RBC # BLD AUTO: 4.53 M/UL (ref 4.7–6.1)
SODIUM SERPL-SCNC: 140 MMOL/L (ref 136–145)
WBC # BLD AUTO: 11.1 K/UL (ref 4.8–10.8)

## 2021-01-14 RX ADMIN — HEPARIN SODIUM SCH UNITS: 5000 INJECTION INTRAVENOUS; SUBCUTANEOUS at 08:52

## 2021-01-14 RX ADMIN — MORPHINE SULFATE PRN MG: 2 INJECTION, SOLUTION INTRAMUSCULAR; INTRAVENOUS at 21:40

## 2021-01-14 RX ADMIN — DEXTROSE MONOHYDRATE SCH MLS/HR: 50 INJECTION, SOLUTION INTRAVENOUS at 14:19

## 2021-01-14 RX ADMIN — HEPARIN SODIUM SCH UNITS: 5000 INJECTION INTRAVENOUS; SUBCUTANEOUS at 21:00

## 2021-01-14 RX ADMIN — DEXTROSE MONOHYDRATE SCH MLS/HR: 50 INJECTION, SOLUTION INTRAVENOUS at 05:52

## 2021-01-14 RX ADMIN — MORPHINE SULFATE PRN MG: 2 INJECTION, SOLUTION INTRAMUSCULAR; INTRAVENOUS at 10:44

## 2021-01-14 RX ADMIN — Medication SCH MLS/HR: at 21:37

## 2021-01-14 RX ADMIN — DEXTROSE MONOHYDRATE SCH MLS/HR: 50 INJECTION, SOLUTION INTRAVENOUS at 21:37

## 2021-01-14 RX ADMIN — Medication SCH MLS/HR: at 10:57

## 2021-01-14 NOTE — NUR
NURSE HAND-OFF: 



Important Events on Shift:[Pain Management]

Patient Status: [stable]

Diet: [reg]



Pending Orders: []

Pending Results/Labs:[]

Pending MD notification:[]



Latest Vital Signs: Temperature 97.7 , Pulse 75 , B/P 131 /74 , Respiratory Rate 20 , O2 SAT 
98 , Room Air, O2 Flow Rate .  

Vital Sign Comment: []



Latest Mehta Fall Score: 35  

Fall Risk: Medium Risk 

Safety Measures: Call light Within Reach, Bed Alarm Zone 1, Side Rails Side Rails x2, Bed 
position Low and Locked.

Fall Precautions: 

Yellow Socks



Report given to [Marcia LEE].

## 2021-01-14 NOTE — NUR
NURSE NOTES:



Received handoff from Hilton LEE. Patient is awake in bed, eating breakfast. IV running Zosyn 
per order. Patient does not seem to be in acute distress, Updated on care plan. Neck abscess 
is covered with 2 4x4 gauze. Call light in reach, bed in lowest position, side rails up.

## 2021-01-14 NOTE — HISTORY AND PHYSICAL
History of Present Illness


General


Date patient seen:  Jan 14, 2021


Time patient seen:  10:30


Reason for Hospitalization:  Abnormal Labs





Present Illness


HPI


HPI


40 years old male, homeless, with past medical history of methamphetamine abuse,

recurrent abscesses in his right arm and right chest, grew MRSA in the past, 

presented initially presented in January 11 blood culture grew gram-negative 

rods laboratory work-up revealed leukocytosis WBC 16.6 lactic acid 0.9





#2020 for sepsis secondary to abscess and cellulitis.  Apparently the current 

symptoms started about 3 weeks ago patient was placed on doxycycline for 10 days

symptoms improved however when he ran out of doxycycline he stated that the 

abscesses got worse.  He denied intravenous drug use.  No headache no neck 

rigidity no photophobia.  No fever chills or night sweats.  He was also 

complaining of nasal congestion sore throat and dry cough.  Tdap up to date last

injection last booster was 2 years ago.  In emergency department chest x-ray 

revealed no acute findings patient received empiric antibiotic patient priorly 

was admitted in November 2020 and wound culture grew MRSA patient refused CAT 

scan at that time patient refused Covid testing patient left patient eloped from

ED then he presented to the ED on 114 patient was called back due to growth of 

gram-negative rods on 111 he denied any new symptoms but reported pain in the 

upper chest and neck area he denied fever and chills upon evaluation patient was

afebrile mild tachycardia 107 laboratory work-up revealed leukocytosis but 

trending down from 16.6 of 111 down to 11.1 hemoglobin 13.6 hematocrit 40.5 

stable electrolytes renal parameters.  CRP 9.5 on prior admission rapid COVID-19

was negative.  Chest x-ray in the prior admission revealed patient subsequently 

admitted for further management.   





PMH: recurrent abscesses, hx of MRSA  


Past surgery: R hand surgery


Social hsitroy: + meth abuse, + marijuana, + tobacco smoking, denies IVDA, 

denies ETOH abuse


Family history: non-contributory  


Medications: reviewed


Allergy: NKDA  


Residence: homeless


Allergies:


Allergies:  


Coded Allergies:  


     No Known Allergies (Unverified , 11/15/20)





COVID-19 Screening


Contact w/high risk pt:  No


Experienced COVID-19 symptoms?:  No





Medication History


Scheduled


Trimethoprim/Sulfamethoxazole 160/800* (Bactrim Ds Tablet*), 1 TAB ORAL Q12H


Trimethoprim/Sulfamethoxazole 160/800* (Bactrim Ds Tablet*), 1 TAB ORAL Q12H





Patient History


Healthcare decision maker





Resuscitation status





Advanced Directive on File








Physical Exam





Last 24 Hour Vital Signs








  Date Time  Temp Pulse Resp B/P (MAP) Pulse Ox O2 Delivery O2 Flow Rate FiO2


 


1/14/21 09:00      Room Air  


 


1/14/21 08:00 97.3 78 20 133/76 (95)    


 


1/14/21 04:00 98.0 71 18 121/74 (90) 98   


 


1/14/21 01:28      Room Air  


 


1/14/21 01:20 97.9 78 19 129/78 98   


 


1/13/21 23:58 98.2  18 128/69 95 Room Air  


 


1/13/21 23:24 98.2 107 18 71/ 95 Room Air  

















Intake and Output  


 


 1/13/21 1/14/21





 19:00 07:00


 


Intake Total  320 ml


 


Balance  320 ml


 


  


 


Intake Oral  120 ml


 


IV Total  200 ml


 


# Voids  1











Laboratory Tests








Test


 1/13/21


23:59


 


White Blood Count


 11.1 K/UL


(4.8-10.8)  H


 


Red Blood Count


 4.53 M/UL


(4.70-6.10)  L


 


Hemoglobin


 13.6 G/DL


(14.2-18.0)  L


 


Hematocrit


 40.5 %


(42.0-52.0)  L


 


Mean Corpuscular Volume 89 FL (80-99)  


 


Mean Corpuscular Hemoglobin


 30.0 PG


(27.0-31.0)


 


Mean Corpuscular Hemoglobin


Concent 33.6 G/DL


(32.0-36.0)


 


Red Cell Distribution Width


 12.9 %


(11.6-14.8)


 


Platelet Count


 380 K/UL


(150-450)


 


Mean Platelet Volume


 7.5 FL


(6.5-10.1)


 


Neutrophils (%) (Auto)


 61.6 %


(45.0-75.0)


 


Lymphocytes (%) (Auto)


 28.1 %


(20.0-45.0)


 


Monocytes (%) (Auto)


 7.8 %


(1.0-10.0)


 


Eosinophils (%) (Auto)


 1.2 %


(0.0-3.0)


 


Basophils (%) (Auto)


 1.4 %


(0.0-2.0)


 


Sodium Level


 140 MMOL/L


(136-145)


 


Potassium Level


 3.7 MMOL/L


(3.5-5.1)


 


Chloride Level


 104 MMOL/L


()


 


Carbon Dioxide Level


 27 MMOL/L


(21-32)


 


Anion Gap


 9 mmol/L


(5-15)


 


Blood Urea Nitrogen


 18 mg/dL


(7-18)


 


Creatinine


 0.8 MG/DL


(0.55-1.30)


 


Estimat Glomerular Filtration


Rate > 60 mL/min


(>60)


 


Glucose Level


 129 MG/DL


()  H


 


Calcium Level


 9.0 MG/DL


(8.5-10.1)











Microbiology








 Date/Time


Source Procedure


Growth Status





 


 1/13/21 23:59


Nasopharynx SARS-CoV-2 RdRp Gene Assay - Final Complete








Height (Feet):  6


Weight (Pounds):  185


Medications





Current Medications








 Medications


  (Trade)  Dose


 Ordered  Sig/Tameka


 Route


 PRN Reason  Start Time


 Stop Time Status Last Admin


Dose Admin


 


 Acetaminophen


  (Tylenol)  650 mg  Q4H  PRN


 ORAL


 Mild Pain (Pain Scale 1-3)  1/14/21 02:00


 2/13/21 01:59  1/14/21 02:54





 


 Acetaminophen/


 Hydrocodone Bitart


  (Norco 5/325)  1 tab  Q4H  PRN


 ORAL


 Moderate Pain (Pain Scale 4-6)  1/14/21 10:30


 1/21/21 10:29   





 


 Heparin Sodium


  (Porcine)


  (Heparin 5000


 units/ml)  5,000 units  EVERY 12  HOURS


 SUBQ


   1/14/21 09:00


 2/28/21 08:59  1/14/21 08:52





 


 Morphine Sulfate


  (Morphine


 Sulfate)  2 mg  Q4H  PRN


 IVP


 SEVERE PAIN (7-10)  1/14/21 10:30


 1/21/21 10:29  1/14/21 10:44





 


 Ondansetron HCl


  (Zofran)  4 mg  Q6H  PRN


 IVP


 Nausea & Vomiting  1/14/21 02:00


 2/13/21 01:59   





 


 Piperacillin Sod/


 Tazobactam Sod


 3.375 gm/Sodium


 Chloride  110 ml @ 


 27.5 mls/hr  EVERY 8  HOURS


 IVPB


   1/14/21 06:00


 1/19/21 05:59  1/14/21 05:52





 


 Sodium Chloride  1,000 ml @ 


 100 mls/hr  Q10H


 IV


   1/14/21 03:00


 2/13/21 02:59  1/14/21 02:46





 


 Vancomycin HCl  300 ml @ 


 150 mls/hr  Q12H


 IVPB


   1/14/21 11:00


 1/19/21 10:59  1/14/21 10:57





 


 Vancomycin HCl


  (Vanco pharmacy


 to dose)  1 ea  DAILY  PRN


 MISC


 Per rx protocol  1/14/21 02:00


 2/13/21 01:59   




















Kim Mancia NP                Jan 14, 2021 12:03

## 2021-01-14 NOTE — NUR
NURSE NOTES:

Received pt via radha from ED. Pt is AOX4 complaining of headache 5/10 on pain scale. 
Denies dizziness or nausea. IV Vanco infusing at this time. IV L AC patent and intact. NAD 
noted. Bed in lowest position and locked. Side rails up x 2. Call light within reach. Will 
continue to monitor.

## 2021-01-14 NOTE — DIAGNOSTIC IMAGING REPORT
Indication: Neck pain, history of abscess

 

Technique:  Spiral acquisitions obtained through the neck. Multiplanar

reconstructions were generated.No IV contrast utilized, for patient preference. 

Total dose length product 476 mGycm.  CTDIvol(s) 14 mGy. Dose reduction achieved

using automated exposure control

 

 

Comparison: 11/16/2020

 

Findings: Exam is limited without IV contrast demonstration

 

There is a raised area of thickening of the subcutaneous fat in the anterior neck

near the base of the neck. This measures approximately 4.5 cm transverse by 1.2 cm AP

by 4 cm craniocaudad. Similar finding was demonstrated previously. It appears larger

on the current exam, but the apparent size may be affected by differences in

positioning, as on the prior study the patient's neck was extended and on the current

exam it is flexed. The attenuation is uniform, soft tissue attenuation. There is

thickening of the surrounding skin, which appears to be greater than on the previous

study, as well as some infiltration of the subcutaneous fat extending away from the

lesion. There appears to be a small superficial ulceration.

 

The adenoids are mildly prominent. The right lateral tonsillar pillars contain

calcifications and are somewhat prominent. Otherwise unremarkable nasopharynx and

hypopharynx and oropharynx. The larynx is unremarkable. The thyroid is unremarkable.

No cervical mass or adenopathy. The bilateral parapharyngeal spaces are clear and

symmetric. The salivary glands are unremarkable. The included lung apices demonstrate

small bullous changes on the right. The included upper mediastinum is unremarkable.

The optic globes are intact. The mastoids are clear. The bones are intact. Lucencies

about the mandibular molars raise concern for apical root abscesses. There is also

evidence of dental caries.

 

Impression: Limited exam, due to lack of IV contrast administration

 

Raised area of thickening of the subcutaneous fat in the anterior neck, as described,

presumably clinically evident, with suggestion of a small superficial ulcer. Most

likely an area of soft tissue cellulitis and ulceration. There is also suggestion of

inflammation of soft tissues extending away from this lesion. Although there is no

suggestion of abscess on this exam, the presence or absence of an abscess cannot be

evaluated adequately in the absence of IV contrast. Sonography may be useful to see

if there is underlying fluid collection.

 

Mildly prominent adenoids and tonsils, also previously reported

 

Apical bullous changes within the right lung

 

Evidence of dental and periodontal disease

 

 

 

The CT scanner at [facility] is accredited by the [employer] College of

[employer]iology and the scans are performed using protocols designed to limit radiation

exposure to as low as reasonably achievable to attain images of sufficient resolution

adequate for diagnostic evaluation.

## 2021-01-14 NOTE — NUR
NOTE



SW attempted to meet w/ pt for psychosocial assessment. Pt is sleeping, did not respond to 
this SW. SW will attempt to meet w/ pt on the next day.

## 2021-01-14 NOTE — NUR
NURSE NOTES:

Received report from jesus mccray. patient is on bed, asleep. on room air, no sob. iv access on 
the left ac, with ivf as ordered. per JESUS mccray," patient doesn't want to get disturb with 
taking vitals or meds when he's asleep". denies any pain or discomfort. kept head of bed 
elevated. bed locked and in lowest position. call light and light button within easy reach. 
will continue plan of care.

## 2021-01-14 NOTE — NUR
TRANSFER TO FLOOR:

Patient transferred to [facility] as ordered, per JESUS Engel .   Report given to 
JESUS Canela. All Belongings sent with the pt. RN took the pt on stable condition.

## 2021-01-14 NOTE — HISTORY AND PHYSICAL
History of Present Illness


General


Date patient seen:  Jan 14, 2021


Time patient seen:  10:00


Reason for Hospitalization:  Skin Rash/Abscess





Present Illness


HPI


40 years old male, homeless, with past medical history of methamphetamine abuse,

recurrent abscesses in his right arm and right chest, grew MRSA in the past, 

presented initially presented in January 11 blood culture grew gram-negative 

rods laboratory work-up revealed leukocytosis WBC 16.6 lactic acid 0.9





#2020 for sepsis secondary to abscess and cellulitis.  Apparently the current 

symptoms started about 3 weeks ago patient was placed on doxycycline for 10 days

symptoms improved however when he ran out of doxycycline he stated that the 

abscesses got worse.  He denied intravenous drug use.  No headache no neck 

rigidity no photophobia.  No fever chills or night sweats.  He was also 

complaining of nasal congestion sore throat and dry cough.  Tdap up to date last

injection last booster was 2 years ago.  In emergency department chest x-ray 

revealed no acute findings patient received empiric antibiotic patient priorly 

was admitted in November 2020 and wound culture grew MRSA patient refused CAT 

scan at that time patient refused Covid testing patient left patient eloped from

ED then he presented to the ED on 114 patient was called back due to growth of 

gram-negative rods on 111 he denied any new symptoms but reported pain in the 

upper chest and neck area he denied fever and chills upon evaluation patient was

afebrile mild tachycardia 107 laboratory work-up revealed leukocytosis but 

trending down from 16.6 of 111 down to 11.1 hemoglobin 13.6 hematocrit 40.5 

stable electrolytes renal parameters.  CRP 9.5 on prior admission rapid COVID-19

was negative.  Chest x-ray in the prior admission revealed patient subsequently 

admitted for further management.   





PMH: recurrent abscesses, hx of MRSA  


Past surgery: R hand surgery


Social hsitroy: + meth abuse, + marijuana, + tobacco smoking, denies IVDA, 

denies ETOH abuse


Family history: non-contributory  


Medications: reviewed


Allergy: NKDA  


Residence: homeless


Allergies:  


Coded Allergies:  


     No Known Allergies (Unverified , 11/15/20)





COVID-19 Screening


Contact w/high risk pt:  No


Experienced COVID-19 symptoms?:  No





Medication History


Scheduled


Trimethoprim/Sulfamethoxazole 160/800* (Bactrim Ds Tablet*), 1 TAB ORAL Q12H


Trimethoprim/Sulfamethoxazole 160/800* (Bactrim Ds Tablet*), 1 TAB ORAL Q12H





Patient History


Healthcare decision maker





Resuscitation status


Full code


Advanced Directive on File








Review of Systems


Eye:  Reports: no symptoms


ENT:  Reports: no symptoms


Respiratory:  Reports: no symptoms


Cardiovascular:  Reports: no symptoms


Gastrointestinal:  Reports: no symptoms


Genitourinary:  Reports: no symptoms


Musculoskeletal:  Reports: no symptoms


Skin:  Reports: see HPI


Psychiatric:  Reports: no symptoms


Endocrine:  Reports: no symptoms


Hematologic/Lymphatic:  Reports: no symptoms





Physical Exam


General Appearance:  WD/WN, no apparent distress


Lines, tubes and drains:  peripheral


HEENT:  normocephalic, atraumatic, anicteric, mucous membranes moist


Neck:  normal alignment, supple


Respiratory/Chest:  lungs clear, no respiratory distress, respiratory distress


Cardiovascular/Chest:  normal peripheral pulses, normal rate


Extremities:  normal range of motion, non-tender


Skin Exam:  other - Multiple indurated abscess anterior to trachea, R chest. Non

fluctuant. No overlying crepitus. No subcutaneous emphysema.


Neurologic:  CNs II-XII grossly normal, no motor/sensory deficits, alert, 

oriented x 3, responsive


Musculoskeletal:  normal muscle bulk


Height (Feet):  6


Weight (Pounds):  190





Assessment/Plan


Assessment/Plan:


ASSESSMENT


GN bacteremia 


Recurrent abscess in pt with polysubstance abuse ( suspected IVDA)


Hx of MRSA 


Polysubstance abuse


Homeless 





 PLAN OF CARE











Kim Mancia NP                Jan 14, 2021 11:57

## 2021-01-14 NOTE — NUR
CASE MANAGEMENT:REVIEW



40 YR OLD MALE WAS INSTRUCTED TO RETURN TO ER



CC; ABNORMAL LABS (POSITIVE BLOOD CX)



SI: NECK ABSCESS. BACTEREMIA. DRUG ABUSE

98.2   107  18   128/69  95% ON RA. 

WBC+11.1



IS: IV ZOSYN

IV VANCOMYCIN

**: TO MED/SURG [facility]

## 2021-01-14 NOTE — EMERGENCY ROOM REPORT
History of Present Illness


General


Chief Complaint:  Abnormal Labs


Source:  Patient





Present Illness


HPI


This is a 40-year-old male with a history of methamphetamine abuse.  He also 

history of abscess to his chest and neck area.  He grew out MRSA in the past.  

He was here yesterday and had blood work done.  He had blood culture that grew 

out gram-negative komal.  He was supposed be admitted but he signed out against 

medical advice this morning.  He was called back.  I also wrote him prescription

of antibiotics.  Here he denies any new symptoms.  He still having pain to the 

upper chest and neck area.  There is no drainage.  He denies any fever chills.  

No nausea no vomiting.  No fever chills.


Allergies:  


Coded Allergies:  


     No Known Allergies (Unverified , 11/15/20)





COVID-19 Screening


Contact w/high risk pt:  No


Experienced COVID-19 symptoms?:  No


COVID-19 Testing performed PTA:  No





Patient History


Past Medical History:  see triage record, old chart reviewed


Past Surgical History:  other


Pertinent Family History:  none


Social History:  Reports: smoking, drug use


Immunizations:  other


Reviewed Nursing Documentation:  PMH: Agreed; PSxH: Agreed





Nursing Documentation-PMH


Past Medical History:  No Stated History





Review of Systems


Eye:  Denies: eye pain, blurred vision


ENT:  Denies: ear pain, nose congestion, throat swelling


Respiratory:  Denies: cough, shortness of breath


Cardiovascular:  Denies: chest pain, palpitations


Gastrointestinal:  Denies: abdominal pain, diarrhea, nausea, vomiting


Musculoskeletal:  Denies: back pain, joint pain


Skin:  Denies: rash


Neurological:  Denies: headache, numbness


Endocrine:  Denies: increased thirst, increased urine


Hematologic/Lymphatic:  Denies: easy bruising


All Other Systems:  negative except mentioned in HPI





Physical Exam





Vital Signs








  Date Time  Temp Pulse Resp B/P (MAP) Pulse Ox O2 Delivery O2 Flow Rate FiO2


 


1/13/21 23:24 98.2 107 18 71/ 95 Room Air  





Vitals unremarkable


Sp02 EP Interpretation:  reviewed, normal


General Appearance:  well appearing, no apparent distress, alert


Head:  normocephalic, atraumatic


Eyes:  bilateral eye PERRL, bilateral eye EOMI


ENT:  hearing grossly normal, normal pharynx


Neck:  full range of motion, supple, no meningismus, other - He has an indurated

erythematous area to aspect of his neck above the sternal notch.  He has 

purulent discharge.


Respiratory:  chest non-tender, lungs clear, normal breath sounds, other - Right

upper chest with ulcer with eschar.  There is surrounding erythema.


Cardiovascular #1:  regular rate, rhythm, no murmur


Gastrointestinal:  normal bowel sounds, non tender, no mass, no organomegaly, no

bruit, non-distended


Musculoskeletal:  back normal, normal range of motion, gait/station normal


Psychiatric:  mood/affect normal





Medical Decision Making


Diagnostic Impression:  


   Primary Impression:  


   Drug abuse


   Additional Impressions:  


   Abscess


   Bacteremia


ER Course


Patient with abscess to his chest and neck area.  This probably secondary to renato

g abuse.  Most likely MRSA since that was to grow in the past.  Patient is 

noncompliant with medication.  He refused certain treatment here.  He does not 

want IV.  He refused to wear a mask claiming that he cannot breathe even though 

he is talking fine without any problem.  He also said he had 3 broken nose in 

the past.  He also refused Covid testing.  He said it hurts too much even though

he never had it done before.  He refused because he said he had 3 broken noses 

in the past.  Explained to the patient that because of the Covid pandemic, he is

required to wear a mask for staff safety and his safety.  I told him that 

because of the pandemic, we need to to see if he is positive for Covid because 

this required different floor in isolation.  Patient refused.





After several attempts and explained that he may die if he leaves AGAINST 

MEDICAL ADVICE, he finally consented to blood work, antibiotics and Covid 

swabbing.





I contacted Dr. Engel for admission.





Last Vital Signs








  Date Time  Temp Pulse Resp B/P (MAP) Pulse Ox O2 Delivery O2 Flow Rate FiO2


 


1/13/21 23:24 98.2 107 18 71/ 95 Room Air  








Status:  improved


Disposition:  ADMITTED AS INPATIENT


Condition:  Serious


Referrals:  


NOT CHOSEN IPA/MD,REFERRING (PCP)











Seven Julian MD                  Jan 14, 2021 00:05

## 2021-01-14 NOTE — HISTORY AND PHYSICAL
History of Present Illness


General


Time patient seen:  10:30


Reason for Hospitalization:  Abnormal Labs





Present Illness


HPI


 


40 years old male, homeless, with past medical history of methamphetamine abuse,

recurrent abscesses in his right arm and right chest, grew MRSA in the past,  

initially presented on January 11.


At that time he had leukocytosis WBC 16.  lactic acid 0.9


Patient refused CT of the neck , Covid testing,  received 1 dose of 1 empiric 

antibiotic and left AGAINST MEDICAL ADVICE.


Blood culture grew gram-negative rods laboratory work-up revealed leukocytosis 

WBC 16.6 lactic acid 0.9


Patient was also hospitalized in November 2020 for sepsis secondary to abscess 

and cellulitis.  At that time wound culture grew MRSA.





The current symptoms started about 3 weeks ago ; patient was  prior placed on 

doxycycline for 10 days , symptoms improved , however when he ran out of 

doxycycline and  stated that the abscesses got worse.  


He denied intravenous drug use. 


he denied having I&D, stated he self drained them . 





No headache ,no neck rigidity ,no photophobia.  


No fever,  chills or night sweats. 


Tdap up to date ( last i  booster was 2 years ago).  





Patient was called to come to ED due to a growth of gram-negative rods in blood.


He denied any new symptoms, but reported pain in the upper chest and neck area.


He denied fever and chills.


Upon evaluation patient was afebrile, mild tachycardia 107.


Laboratory work-up revealed leukocytosis but trending down from 16.6 on 1/11 

down to 11.1 ; hemoglobin 13.6 , hematocrit 40.5 , stable electrolytes  and 

renal parameters.  


Rapid COVID-19 was negative.  


Chest x-ray on the prior admission revealed no acute cardiopulmonary pathology.


Patient subsequently admitted for further management.   





PMH: recurrent abscesses, hx of MRSA  


Past surgery: R hand surgery


Social hsitroy: + meth abuse, + marijuana, + tobacco smoking, denies IVDA, 

denies ETOH abuse


Family history: non-contributory  


Medications: reviewed


Allergy: NKDA  


Residence: homeless





Allergies:  


Coded Allergies:  


     No Known Allergies (Unverified , 11/15/20)





COVID-19 Screening


Contact w/high risk pt:  No


Experienced COVID-19 symptoms?:  No





Medication History


Scheduled


Trimethoprim/Sulfamethoxazole 160/800* (Bactrim Ds Tablet*), 1 TAB ORAL Q12H


Trimethoprim/Sulfamethoxazole 160/800* (Bactrim Ds Tablet*), 1 TAB ORAL Q12H





Patient History


Healthcare decision maker





Resuscitation status


Full code


Advanced Directive on File








Review of Systems


Constitutional:  Reports: no symptoms


Eye:  Reports: no symptoms


ENT:  Reports: no symptoms


Respiratory:  Reports: no symptoms


Cardiovascular:  Reports: no symptoms


Gastrointestinal:  Reports: no symptoms


Genitourinary:  Reports: no symptoms


Musculoskeletal:  Reports: no symptoms


Skin:  Reports: see HPI


Psychiatric:  Reports: no symptoms


Neurological:  Reports: no symptoms


Endocrine:  Reports: no symptoms


Hematologic/Lymphatic:  Reports: no symptoms





Physical Exam


Physical Exam Narrative


General Appearance:  WD/WN, no apparent distress


Lines, tubes and drains:  peripheral


HEENT:  normocephalic, atraumatic, anicteric, mucous membranes moist


Neck:  normal alignment, supple


Respiratory/Chest:  lungs clear, no respiratory distress, respiratory distress


Cardiovascular/Chest:  normal peripheral pulses, normal rate


Extremities:  normal range of motion, non-tender


Skin Exam:   - Multiple indurated abscess anterior to trachea, R chest. Non 

fluctuant. No overlying crepitus. No subcutaneous emphysema.


Neurologic:  CNs II-XII grossly normal, no motor/sensory deficits, alert, 

oriented x 3, responsive


Musculoskeletal:  normal muscle bulk





Last 24 Hour Vital Signs








  Date Time  Temp Pulse Resp B/P (MAP) Pulse Ox O2 Delivery O2 Flow Rate FiO2


 


1/14/21 09:00      Room Air  


 


1/14/21 08:00 97.3 78 20 133/76 (95)    


 


1/14/21 04:00 98.0 71 18 121/74 (90) 98   


 


1/14/21 01:28      Room Air  


 


1/14/21 01:20 97.9 78 19 129/78 98   


 


1/13/21 23:58 98.2  18 128/69 95 Room Air  


 


1/13/21 23:24 98.2 107 18 71/ 95 Room Air  

















Intake and Output  


 


 1/13/21 1/14/21





 19:00 07:00


 


Intake Total  320 ml


 


Balance  320 ml


 


  


 


Intake Oral  120 ml


 


IV Total  200 ml


 


# Voids  1











Laboratory Tests








Test


 1/13/21


23:59


 


White Blood Count


 11.1 K/UL


(4.8-10.8)  H


 


Red Blood Count


 4.53 M/UL


(4.70-6.10)  L


 


Hemoglobin


 13.6 G/DL


(14.2-18.0)  L


 


Hematocrit


 40.5 %


(42.0-52.0)  L


 


Mean Corpuscular Volume 89 FL (80-99)  


 


Mean Corpuscular Hemoglobin


 30.0 PG


(27.0-31.0)


 


Mean Corpuscular Hemoglobin


Concent 33.6 G/DL


(32.0-36.0)


 


Red Cell Distribution Width


 12.9 %


(11.6-14.8)


 


Platelet Count


 380 K/UL


(150-450)


 


Mean Platelet Volume


 7.5 FL


(6.5-10.1)


 


Neutrophils (%) (Auto)


 61.6 %


(45.0-75.0)


 


Lymphocytes (%) (Auto)


 28.1 %


(20.0-45.0)


 


Monocytes (%) (Auto)


 7.8 %


(1.0-10.0)


 


Eosinophils (%) (Auto)


 1.2 %


(0.0-3.0)


 


Basophils (%) (Auto)


 1.4 %


(0.0-2.0)


 


Sodium Level


 140 MMOL/L


(136-145)


 


Potassium Level


 3.7 MMOL/L


(3.5-5.1)


 


Chloride Level


 104 MMOL/L


()


 


Carbon Dioxide Level


 27 MMOL/L


(21-32)


 


Anion Gap


 9 mmol/L


(5-15)


 


Blood Urea Nitrogen


 18 mg/dL


(7-18)


 


Creatinine


 0.8 MG/DL


(0.55-1.30)


 


Estimat Glomerular Filtration


Rate > 60 mL/min


(>60)


 


Glucose Level


 129 MG/DL


()  H


 


Calcium Level


 9.0 MG/DL


(8.5-10.1)











Microbiology








 Date/Time


Source Procedure


Growth Status





 


 1/13/21 23:59


Nasopharynx SARS-CoV-2 RdRp Gene Assay - Final Complete








Height (Feet):  6


Weight (Pounds):  185


Medications





Current Medications








 Medications


  (Trade)  Dose


 Ordered  Sig/Tameka


 Route


 PRN Reason  Start Time


 Stop Time Status Last Admin


Dose Admin


 


 Acetaminophen


  (Tylenol)  650 mg  Q4H  PRN


 ORAL


 Mild Pain (Pain Scale 1-3)  1/14/21 02:00


 2/13/21 01:59  1/14/21 02:54





 


 Acetaminophen/


 Hydrocodone Bitart


  (Norco 5/325)  1 tab  Q4H  PRN


 ORAL


 Moderate Pain (Pain Scale 4-6)  1/14/21 10:30


 1/21/21 10:29   





 


 Heparin Sodium


  (Porcine)


  (Heparin 5000


 units/ml)  5,000 units  EVERY 12  HOURS


 SUBQ


   1/14/21 09:00


 2/28/21 08:59  1/14/21 08:52





 


 Morphine Sulfate


  (Morphine


 Sulfate)  2 mg  Q4H  PRN


 IVP


 SEVERE PAIN (7-10)  1/14/21 10:30


 1/21/21 10:29  1/14/21 10:44





 


 Ondansetron HCl


  (Zofran)  4 mg  Q6H  PRN


 IVP


 Nausea & Vomiting  1/14/21 02:00


 2/13/21 01:59   





 


 Piperacillin Sod/


 Tazobactam Sod


 3.375 gm/Sodium


 Chloride  110 ml @ 


 27.5 mls/hr  EVERY 8  HOURS


 IVPB


   1/14/21 06:00


 1/19/21 05:59  1/14/21 05:52





 


 Sodium Chloride  1,000 ml @ 


 100 mls/hr  Q10H


 IV


   1/14/21 03:00


 2/13/21 02:59  1/14/21 02:46





 


 Vancomycin HCl  300 ml @ 


 150 mls/hr  Q12H


 IVPB


   1/14/21 11:00


 1/19/21 10:59  1/14/21 10:57





 


 Vancomycin HCl


  ([facility]


 to dose)  1 ea  DAILY  PRN


 MISC


 Per rx protocol  1/14/21 02:00


 2/13/21 01:59   














Assessment/Plan


Assessment/Plan:


ASSESSMENT


Gram negative bacteremia


Recurrent abscess   in patient with polysubstance abuse ( suspected IVDA) 


Hx of MRSA 


Polysubstance abuse 


Homeless  





PLAN OF CARE 


MS floor


CT neck


empiric abx


fup with wcx


ID  and surgery consult  


check ESR and CRP 


pain management 


 on compliance  with medications, care and abstinence from illicit drug 

use


SW consult    





case discussed and evaluated by supervising physician











Kim Mancia NP                Jan 14, 2021 12:13

## 2021-01-14 NOTE — CONSULTATION
DATE OF CONSULTATION:  01/14/2021

INFECTIOUS DISEASES CONSULTATION



This consult is for coverage of Dr. Rogers.



CONSULTING PHYSICIAN:  Alex Tierney MD



PRIMARY ATTENDING PHYSICIAN:  Irineo Engel MD.



REASON FOR CONSULTATION:  Gram-negative sepsis and neck abscess.



HISTORY OF PRESENT ILLNESS:  This is a 40-year-old male admitted yesterday

complaining of neck pain.  He was in the ER on 01/11/2021 because of neck

pain and had neck abscess but he eloped from the hospital.  Before that,

he also had admission for abscess in the right arm.



PAST MEDICAL HISTORY:  Drug abuse, homelessness.



ALLERGIES:  No known drug allergies.



MEDICATIONS:  Vancomycin, morphine, Norco, heparin, Zosyn, sodium chloride,

Tylenol.



SOCIAL HISTORY:  Single.  Homeless.  Smokes one pack of cigarettes a day.

Use marijuana.   Denies IV drug abuse.  Has history of methamphetamine

abuse.



REVIEW OF SYSTEMS:  No fever.  No chills.  Pain in the neck area.  There

was some discharge from the neck.  No nausea.  No vomiting. 

diarrhea before that is resolved.  No problem passing urine.



PHYSICAL EXAMINATION:

VITAL SIGNS:  Temperature 97.7, pulse 75, blood pressure 131/74.

GENERAL APPEARANCE:  Well developed, no acute distress.

HEAD AND NECK:  Pink conjunctivae.  No oral lesion.  midline lower neck

abscess with some drainage.  There is also erythema and skin lesion in the

right subclavian area.

HEART:  Normal rate.

LUNGS:  Clear.

ABDOMEN:  Soft and nontender.

EXTREMITIES:  No edema.

NEUROLOGIC:  Awake, alert, oriented x3.



LABORATORY DATA:  Sodium 140, potassium 3.7, chloride 104, bicarb 27, BUN

18, creatinine 0.8, glucose 129.  WBC 11.1, hemoglobin 13.6, hematocrit

40.5, platelets is 380.  COVID test was negative.  Blood culture from

01/11/2021 growing gram-negative rods.



IMPRESSION:  Gram-negative sepsis, has lower neck and upper chest abscess,

homeless, has nicotine dependence, drug abuse.



RECOMMENDATION:  Continue with Zosyn and vancomycin.  We will follow up

blood and wound cultures.  The patient needs surgical evaluation.  We will

check HIV status.



At the end of my exam, I thank Dr. Engel, for involving me in the care

of this patient.









  ______________________________________________

  Alex Tierney M.D.





DR:  Graciela

D:  01/14/2021 12:49

T:  01/14/2021 13:07

JOB#:  [id number]

CC:



JESSICA

## 2021-01-14 NOTE — NUR
NURSE NOTES:



Patient complaining of 10/10  pain. Spoke to Kim Mancia NP and got order for PRN morphine 
2mg, given as ordered. Dressing on neck abscess changed with xeroform and 4x4 gauze per Dr. Camacho.

## 2021-01-14 NOTE — NUR
NURSE HAND-OFF: 



Important Events on Shift: none 

Patient Status: stable 

Diet: Regular 



Pending Orders: none 

Pending Results/Labs: none 

Pending MD notification: none 



Latest Vital Signs: Temperature 98.0 , Pulse 71 , B/P 121 /74 , Respiratory Rate 18 , O2 SAT 
98 , Room Air, O2 Flow Rate .  

Vital Sign Comment: Stable 



Latest Mehta Fall Score: 35  

Fall Risk: Medium Risk 

Safety Measures: Call light Within Reach, Bed Alarm Zone 1, Side Rails Side Rails x2, Bed 
position Low and Locked.

Fall Precautions: 

Yellow Socks



Report given to: JESUS Ramires

## 2021-01-15 VITALS — SYSTOLIC BLOOD PRESSURE: 125 MMHG | DIASTOLIC BLOOD PRESSURE: 72 MMHG

## 2021-01-15 VITALS — DIASTOLIC BLOOD PRESSURE: 75 MMHG | SYSTOLIC BLOOD PRESSURE: 128 MMHG

## 2021-01-15 VITALS — DIASTOLIC BLOOD PRESSURE: 76 MMHG | SYSTOLIC BLOOD PRESSURE: 117 MMHG

## 2021-01-15 VITALS — DIASTOLIC BLOOD PRESSURE: 75 MMHG | SYSTOLIC BLOOD PRESSURE: 130 MMHG

## 2021-01-15 LAB
ADD MANUAL DIFF: NO
ALBUMIN SERPL-MCNC: 2.9 G/DL (ref 3.4–5)
ALBUMIN/GLOB SERPL: 0.7 {RATIO} (ref 1–2.7)
ALP SERPL-CCNC: 130 U/L (ref 46–116)
ALT SERPL-CCNC: 101 U/L (ref 12–78)
ANION GAP SERPL CALC-SCNC: 6 MMOL/L (ref 5–15)
AST SERPL-CCNC: 29 U/L (ref 15–37)
BASOPHILS NFR BLD AUTO: 1.7 % (ref 0–2)
BILIRUB SERPL-MCNC: 0.3 MG/DL (ref 0.2–1)
BUN SERPL-MCNC: 15 MG/DL (ref 7–18)
CALCIUM SERPL-MCNC: 8.9 MG/DL (ref 8.5–10.1)
CHLORIDE SERPL-SCNC: 102 MMOL/L (ref 98–107)
CO2 SERPL-SCNC: 28 MMOL/L (ref 21–32)
CREAT SERPL-MCNC: 0.8 MG/DL (ref 0.55–1.3)
EOSINOPHIL NFR BLD AUTO: 2.7 % (ref 0–3)
ERYTHROCYTE [DISTWIDTH] IN BLOOD BY AUTOMATED COUNT: 12.8 % (ref 11.6–14.8)
GLOBULIN SER-MCNC: 4.2 G/DL
HCT VFR BLD CALC: 49 % (ref 42–52)
HGB BLD-MCNC: 15.8 G/DL (ref 14.2–18)
LYMPHOCYTES NFR BLD AUTO: 26.5 % (ref 20–45)
MCV RBC AUTO: 90 FL (ref 80–99)
MONOCYTES NFR BLD AUTO: 9.1 % (ref 1–10)
NEUTROPHILS NFR BLD AUTO: 60 % (ref 45–75)
PLATELET # BLD: 445 K/UL (ref 150–450)
POTASSIUM SERPL-SCNC: 4 MMOL/L (ref 3.5–5.1)
RBC # BLD AUTO: 5.44 M/UL (ref 4.7–6.1)
SODIUM SERPL-SCNC: 136 MMOL/L (ref 136–145)
WBC # BLD AUTO: 9.2 K/UL (ref 4.8–10.8)

## 2021-01-15 RX ADMIN — Medication SCH MLS/HR: at 23:48

## 2021-01-15 RX ADMIN — DEXTROSE MONOHYDRATE SCH MLS/HR: 50 INJECTION, SOLUTION INTRAVENOUS at 19:46

## 2021-01-15 RX ADMIN — Medication SCH MLS/HR: at 11:29

## 2021-01-15 RX ADMIN — DEXTROSE MONOHYDRATE SCH MLS/HR: 50 INJECTION, SOLUTION INTRAVENOUS at 05:50

## 2021-01-15 RX ADMIN — DEXTROSE MONOHYDRATE SCH MLS/HR: 50 INJECTION, SOLUTION INTRAVENOUS at 14:11

## 2021-01-15 RX ADMIN — MORPHINE SULFATE PRN MG: 2 INJECTION, SOLUTION INTRAMUSCULAR; INTRAVENOUS at 20:05

## 2021-01-15 RX ADMIN — HEPARIN SODIUM SCH UNITS: 5000 INJECTION INTRAVENOUS; SUBCUTANEOUS at 19:47

## 2021-01-15 RX ADMIN — HEPARIN SODIUM SCH UNITS: 5000 INJECTION INTRAVENOUS; SUBCUTANEOUS at 09:24

## 2021-01-15 NOTE — INFECTIOUS DISEASES PROG NOTE
Assessment/Plan


Assessment/Plan


antibiotics : vancomycin iv, zosyn





A


1. neck wound infection


2. sphingomonas sepsis





P


1. continue iv vancomycin, zosyn


2. neck wound culture


3. will follow up cultures





Subjective


Constitutional:  Denies: fever, chills


Respiratory:  Reports: dry cough; Denies: shortness of breath


Gastrointestinal/Abdominal:  Denies: nausea, vomiting, diarrhea


Neurologic:  Reports: headache


Musculoskeletal:  Reports: pain


Allergies:  


Coded Allergies:  


     No Known Allergies (Unverified , 11/15/20)





Objective





Last 24 Hour Vital Signs








  Date Time  Temp Pulse Resp B/P (MAP) Pulse Ox O2 Delivery O2 Flow Rate FiO2


 


1/15/21 09:00      Room Air  


 


1/15/21 04:00 97.9 72 20 125/72 (89) 99   


 


1/14/21 22:10 97.7       


 


1/14/21 21:00      Room Air  


 


1/14/21 20:00 97.6 76 20 129/76 (93) 98   


 


1/14/21 12:00 97.7 75 20 131/74 (93) 98   








Height (Feet):  6


Weight (Pounds):  185


HEENT:  other - neck wound


Respiratory/Chest:  lungs clear


Cardiovascular:  normal rate, regular rhythm, no gallop/murmur


Abdomen:  soft, non tender


Extremities:  no edema





Microbiology








 Date/Time


Source Procedure


Growth Status





 


 1/13/21 23:59


Nasopharynx SARS-CoV-2 RdRp Gene Assay - Final Complete











Laboratory Tests








Test


 1/15/21


09:45


 


White Blood Count


 9.2 K/UL


(4.8-10.8)


 


Red Blood Count


 5.44 M/UL


(4.70-6.10)


 


Hemoglobin


 15.8 G/DL


(14.2-18.0)


 


Hematocrit


 49.0 %


(42.0-52.0)


 


Mean Corpuscular Volume 90 FL (80-99)  


 


Mean Corpuscular Hemoglobin


 29.0 PG


(27.0-31.0)


 


Mean Corpuscular Hemoglobin


Concent 32.1 G/DL


(32.0-36.0)


 


Red Cell Distribution Width


 12.8 %


(11.6-14.8)


 


Platelet Count


 445 K/UL


(150-450)


 


Mean Platelet Volume


 7.2 FL


(6.5-10.1)


 


Neutrophils (%) (Auto)


 60.0 %


(45.0-75.0)


 


Lymphocytes (%) (Auto)


 26.5 %


(20.0-45.0)


 


Monocytes (%) (Auto)


 9.1 %


(1.0-10.0)


 


Eosinophils (%) (Auto)


 2.7 %


(0.0-3.0)


 


Basophils (%) (Auto)


 1.7 %


(0.0-2.0)


 


Erythrocyte Sedimentation Rate Pending  


 


Sodium Level


 136 MMOL/L


(136-145)


 


Potassium Level


 4.0 MMOL/L


(3.5-5.1)


 


Chloride Level


 102 MMOL/L


()


 


Carbon Dioxide Level


 28 MMOL/L


(21-32)


 


Anion Gap


 6 mmol/L


(5-15)


 


Blood Urea Nitrogen


 15 mg/dL


(7-18)


 


Creatinine


 0.8 MG/DL


(0.55-1.30)


 


Estimat Glomerular Filtration


Rate > 60 mL/min


(>60)


 


Glucose Level


 97 MG/DL


()


 


Calcium Level


 8.9 MG/DL


(8.5-10.1)


 


Total Bilirubin


 0.3 MG/DL


(0.2-1.0)


 


Aspartate Amino Transf


(AST/SGOT) 29 U/L (15-37)





 


Alanine Aminotransferase


(ALT/SGPT) 101 U/L


(12-78)  H


 


Alkaline Phosphatase


 130 U/L


()  H


 


C-Reactive Protein,


Quantitative 1.1 mg/dL


(0.00-0.90)  H


 


Total Protein


 7.1 G/DL


(6.4-8.2)


 


Albumin


 2.9 G/DL


(3.4-5.0)  L


 


Globulin 4.2 g/dL  


 


Albumin/Globulin Ratio


 0.7 (1.0-2.7)


L


 


HIV (1&2) Antibody Rapid Pending  











Current Medications








 Medications


  (Trade)  Dose


 Ordered  Sig/Tameka


 Route


 PRN Reason  Start Time


 Stop Time Status Last Admin


Dose Admin


 


 Acetaminophen


  (Tylenol)  650 mg  Q4H  PRN


 ORAL


 Mild Pain (Pain Scale 1-3)  1/14/21 02:00


 2/13/21 01:59  1/14/21 02:54





 


 Acetaminophen/


 Hydrocodone Bitart


  (Norco 5/325)  1 tab  Q4H  PRN


 ORAL


 Moderate Pain (Pain Scale 4-6)  1/14/21 10:30


 1/21/21 10:29   





 


 Heparin Sodium


  (Porcine)


  (Heparin 5000


 units/ml)  5,000 units  EVERY 12  HOURS


 SUBQ


   1/14/21 09:00


 2/28/21 08:59  1/15/21 09:24





 


 Iohexol


  (OMNIPAQUE-300


 100ml)  100 ml  ONCE  PRN


 INJ


 for radiology  1/14/21 12:15


 1/17/21 23:59   





 


 Morphine Sulfate


  (Morphine


 Sulfate)  2 mg  Q4H  PRN


 IVP


 SEVERE PAIN (7-10)  1/14/21 10:30


 1/21/21 10:29  1/14/21 21:40





 


 Ondansetron HCl


  (Zofran)  4 mg  Q6H  PRN


 IVP


 Nausea & Vomiting  1/14/21 02:00


 2/13/21 01:59   





 


 Piperacillin Sod/


 Tazobactam Sod


 3.375 gm/Sodium


 Chloride  110 ml @ 


 27.5 mls/hr  EVERY 8  HOURS


 IVPB


   1/14/21 06:00


 1/19/21 05:59  1/15/21 05:50





 


 Sodium Chloride  1,000 ml @ 


 100 mls/hr  Q10H


 IV


   1/14/21 03:00


 2/13/21 02:59  1/15/21 09:23





 


 Vancomycin HCl  300 ml @ 


 150 mls/hr  Q12H


 IVPB


   1/14/21 11:00


 1/19/21 10:59  1/14/21 21:37





 


 Vancomycin HCl


  ([facility]


 to dose)  1 ea  DAILY  PRN


 MISC


 Per rx protocol  1/14/21 02:00


 2/13/21 01:59   




















Hermes Rogers MD      Gurmeet 15, 2021 11:28

## 2021-01-15 NOTE — SURGERY PROGRESS NOTE
Surgery Progress Note


Subjective


Additional Comments


ct noted


no n/v


dressings going well


more amenable to treatment today





Objective





Last 24 Hour Vital Signs








  Date Time  Temp Pulse Resp B/P (MAP) Pulse Ox O2 Delivery O2 Flow Rate FiO2


 


1/15/21 16:00 97.8 88 18 128/75 (92) 96   


 


1/15/21 12:00 96.8 88 18 130/75 (93) 96   


 


1/15/21 09:00      Room Air  


 


1/15/21 04:00 97.9 72 20 125/72 (89) 99   


 


1/14/21 22:10 97.7       


 


1/14/21 21:00      Room Air  


 


1/14/21 20:00 97.6 76 20 129/76 (93) 98   








I&O











Intake and Output  


 


 1/14/21 1/15/21





 19:00 07:00


 


Intake Total 600 ml 650 ml


 


Balance 600 ml 650 ml


 


  


 


Intake Oral 600 ml 650 ml


 


# Voids 3 4








Dressing:  saturated


Cardiovascular:  RSR


Respiratory:  clear


Abdomen:  soft, non-tender, present bowel sounds


Extremities:  no edema, no tenderness, no cyanosis





Laboratory Tests








Test


 1/15/21


09:45


 


White Blood Count


 9.2 K/UL


(4.8-10.8)


 


Red Blood Count


 5.44 M/UL


(4.70-6.10)


 


Hemoglobin


 15.8 G/DL


(14.2-18.0)


 


Hematocrit


 49.0 %


(42.0-52.0)


 


Mean Corpuscular Volume 90 FL (80-99)  


 


Mean Corpuscular Hemoglobin


 29.0 PG


(27.0-31.0)


 


Mean Corpuscular Hemoglobin


Concent 32.1 G/DL


(32.0-36.0)


 


Red Cell Distribution Width


 12.8 %


(11.6-14.8)


 


Platelet Count


 445 K/UL


(150-450)


 


Mean Platelet Volume


 7.2 FL


(6.5-10.1)


 


Neutrophils (%) (Auto)


 60.0 %


(45.0-75.0)


 


Lymphocytes (%) (Auto)


 26.5 %


(20.0-45.0)


 


Monocytes (%) (Auto)


 9.1 %


(1.0-10.0)


 


Eosinophils (%) (Auto)


 2.7 %


(0.0-3.0)


 


Basophils (%) (Auto)


 1.7 %


(0.0-2.0)


 


Erythrocyte Sedimentation Rate


 20 MM/HR


(0-15)  H


 


Sodium Level


 136 MMOL/L


(136-145)


 


Potassium Level


 4.0 MMOL/L


(3.5-5.1)


 


Chloride Level


 102 MMOL/L


()


 


Carbon Dioxide Level


 28 MMOL/L


(21-32)


 


Anion Gap


 6 mmol/L


(5-15)


 


Blood Urea Nitrogen


 15 mg/dL


(7-18)


 


Creatinine


 0.8 MG/DL


(0.55-1.30)


 


Estimat Glomerular Filtration


Rate > 60 mL/min


(>60)


 


Glucose Level


 97 MG/DL


()


 


Calcium Level


 8.9 MG/DL


(8.5-10.1)


 


Total Bilirubin


 0.3 MG/DL


(0.2-1.0)


 


Aspartate Amino Transf


(AST/SGOT) 29 U/L (15-37)





 


Alanine Aminotransferase


(ALT/SGPT) 101 U/L


(12-78)  H


 


Alkaline Phosphatase


 130 U/L


()  H


 


C-Reactive Protein,


Quantitative 1.1 mg/dL


(0.00-0.90)  H


 


Total Protein


 7.1 G/DL


(6.4-8.2)


 


Albumin


 2.9 G/DL


(3.4-5.0)  L


 


Globulin 4.2 g/dL  


 


Albumin/Globulin Ratio


 0.7 (1.0-2.7)


L


 


HIV (1&2) Antibody Rapid Pending  











Plan


Problems:  


(1) Sepsis


(2) Encounter for medication refill


(3) Cellulitis


Assessment & Plan:  There is a raised area of thickening of the subcutaneous fat

in the anterior neck


near the base of the neck. This measures approximately 4.5 cm transverse by 1.2 

cm AP


by 4 cm craniocaudad. Similar finding was demonstrated previously. It appears 

larger


on the current exam, but the apparent size may be affected by differences in


positioning, as on the prior study the patient's neck was extended and on the 

current


exam it is flexed. The attenuation is uniform, soft tissue attenuation. There is


thickening of the surrounding skin, which appears to be greater than on the 

previous


study, as well as some infiltration of the subcutaneous fat extending away from 

the


lesion. There appears to be a small superficial ulceration.


 


The adenoids are mildly prominent. The right lateral tonsillar pillars contain


calcifications and are somewhat prominent. Otherwise unremarkable nasopharynx 

and


hypopharynx and oropharynx. The larynx is unremarkable. The thyroid is 

unremarkable.


No cervical mass or adenopathy. The bilateral parapharyngeal spaces are clear 

and


symmetric. The salivary glands are unremarkable. The included lung apices 

demonstrate


small bullous changes on the right. The included upper mediastinum is 

unremarkable.


The optic globes are intact. The mastoids are clear. The bones are intact. 

Lucencies


about the mandibular molars raise concern for apical root abscesses. There is 

also


evidence of dental caries.


 


Impression: Limited exam, due to lack of IV contrast administration


 


Raised area of thickening of the subcutaneous fat in the anterior neck, as 

described,


presumably clinically evident, with suggestion of a small superficial ulcer. 

Most


likely an area of soft tissue cellulitis and ulceration. There is also 

suggestion of


inflammation of soft tissues extending away from this lesion. Although there is 

no


suggestion of abscess on this exam, the presence or absence of an abscess cannot

be


evaluated adequately in the absence of IV contrast. Sonography may be useful to 

see


if there is underlying fluid collection.


 


Mildly prominent adenoids and tonsils, also previously reported


 


Apical bullous changes within the right lung


 


Evidence of dental and periodontal disease





Carbuncle CT identified continue antibiotics and local wound care no acute 

surgical intervention planned at this time.





(4) Neck pain


(5) Homeless


(6) Drug abuse


(7) Abscess


(8) Bacteremia











Mathew Camacho                Gurmeet 15, 2021 16:30

## 2021-01-15 NOTE — CONSULTATION
History of Present Illness


General


Date patient seen:  Gurmeet 15, 2021


Reason for Hospitalization:  Abnormal Labs





Present Illness


HPI


Late entry as patient was initially seen on January 14, 2020 when care plan 

initiated patient presented to  [facility] complaining of worsening 

sternal neck abscess wound.  States has been draining himself.  Feels unwell.  

Significant pain.  Surgery called to eval assist with care.


Allergies:  


Coded Allergies:  


     No Known Allergies (Unverified , 11/15/20)





COVID-19 Screening


Contact w/high risk pt:  No


Experienced COVID-19 symptoms?:  No





Medication History


Discontinued Medications


Trimethoprim/Sulfamethoxazole 160/800* (Bactrim Ds Tablet*), 1 TAB ORAL Q12H


   Discontinued Reason: Therapy completed


Trimethoprim/Sulfamethoxazole 160/800* (Bactrim Ds Tablet*), 1 TAB ORAL Q12H


   Discontinued Reason: Therapy completed





Patient History


History Provided By:  Patient, Medical Record, PMD


Healthcare decision maker





Resuscitation status





Advanced Directive on File








Past Medical/Surgical History


Past Medical/Surgical History:  


(1) Drug abuse


(2) Abscess


(3) Bacteremia


(4) Cellulitis


(5) Sepsis


(6) Neck pain


(7) Homeless


(8) Encounter for medication refill





Review of Systems


Review of Symptoms


General ROS: no weight loss or fever


Psychological ROS: no depression or mood changes, no memory loss


Ophthalmic ROS: no visual changes or eye irritation


ENT ROS: no nasal congestion, hearing loss, dizziness


Allergy and Immunology ROS: no allergic symptoms or urticaria


Hematological and Lymphatic ROS: no swollen glands, unusual bleeding or bruising


Endocrine ROS: no polyuria, polydipsia, weight changes, temperature intolerance


Respiratory ROS: no cough, shortness of breath, or wheezing


Cardiovascular ROS: no chest pain or dyspnea on exertion


Gastrointestinal ROS: denies abdominal pain, bright red blood in stool.


Musculoskeletal ROS: no myalgias or arthralgias


Neurological ROS: no TIA or stroke symptoms


Dermatological ROS: no new or changing skin lesions, rashes or pruritis





Physical Exam


Physical Exam


General appearance:  alert, cooperative, no distress, appears stated age


Head:  Normocephalic, without obvious abnormality, atraumatic


Eyes:  conjunctivae/corneas clear. PERRL, EOM's intact. Fundi benign


Throat:  Lips, mucosa, and tongue normal. Teeth and gums normal


Neck:  supple, symmetrical, trachea midline, no adenopathy, thyroid: not 

enlarged, symmetric, no tenderness/mass/nodules, no carotid bruit and no JVD 

abscess identified


Lungs:  clear to auscultation bilaterally


Heart:  regular rate and rhythm, S1, S2 normal, no murmur, click, rub or gallop


Abdomen:  soft, non-tender. Bowel sounds normal. No masses,  no organomegaly


Extremities:  extremities normal, atraumatic, no cyanosis or edema


Pulses:  2+ and symmetric


Skin:  Skin color, texture, turgor normal. No rashes or lesions


Neurologic:  Grossly normal





Last 24 Hour Vital Signs








  Date Time  Temp Pulse Resp B/P (MAP) Pulse Ox O2 Delivery O2 Flow Rate FiO2


 


1/15/21 16:00 97.8 88 18 128/75 (92) 96   


 


1/15/21 12:00 96.8 88 18 130/75 (93) 96   


 


1/15/21 09:00      Room Air  


 


1/15/21 04:00 97.9 72 20 125/72 (89) 99   


 


1/14/21 22:10 97.7       


 


1/14/21 21:00      Room Air  


 


1/14/21 20:00 97.6 76 20 129/76 (93) 98   

















Intake and Output  


 


 1/14/21 1/15/21





 19:00 07:00


 


Intake Total 600 ml 650 ml


 


Balance 600 ml 650 ml


 


  


 


Intake Oral 600 ml 650 ml


 


# Voids 3 4











Laboratory Tests








Test


 1/15/21


09:45


 


White Blood Count


 9.2 K/UL


(4.8-10.8)


 


Red Blood Count


 5.44 M/UL


(4.70-6.10)


 


Hemoglobin


 15.8 G/DL


(14.2-18.0)


 


Hematocrit


 49.0 %


(42.0-52.0)


 


Mean Corpuscular Volume 90 FL (80-99)  


 


Mean Corpuscular Hemoglobin


 29.0 PG


(27.0-31.0)


 


Mean Corpuscular Hemoglobin


Concent 32.1 G/DL


(32.0-36.0)


 


Red Cell Distribution Width


 12.8 %


(11.6-14.8)


 


Platelet Count


 445 K/UL


(150-450)


 


Mean Platelet Volume


 7.2 FL


(6.5-10.1)


 


Neutrophils (%) (Auto)


 60.0 %


(45.0-75.0)


 


Lymphocytes (%) (Auto)


 26.5 %


(20.0-45.0)


 


Monocytes (%) (Auto)


 9.1 %


(1.0-10.0)


 


Eosinophils (%) (Auto)


 2.7 %


(0.0-3.0)


 


Basophils (%) (Auto)


 1.7 %


(0.0-2.0)


 


Erythrocyte Sedimentation Rate


 20 MM/HR


(0-15)  H


 


Sodium Level


 136 MMOL/L


(136-145)


 


Potassium Level


 4.0 MMOL/L


(3.5-5.1)


 


Chloride Level


 102 MMOL/L


()


 


Carbon Dioxide Level


 28 MMOL/L


(21-32)


 


Anion Gap


 6 mmol/L


(5-15)


 


Blood Urea Nitrogen


 15 mg/dL


(7-18)


 


Creatinine


 0.8 MG/DL


(0.55-1.30)


 


Estimat Glomerular Filtration


Rate > 60 mL/min


(>60)


 


Glucose Level


 97 MG/DL


()


 


Calcium Level


 8.9 MG/DL


(8.5-10.1)


 


Total Bilirubin


 0.3 MG/DL


(0.2-1.0)


 


Aspartate Amino Transf


(AST/SGOT) 29 U/L (15-37)





 


Alanine Aminotransferase


(ALT/SGPT) 101 U/L


(12-78)  H


 


Alkaline Phosphatase


 130 U/L


()  H


 


C-Reactive Protein,


Quantitative 1.1 mg/dL


(0.00-0.90)  H


 


Total Protein


 7.1 G/DL


(6.4-8.2)


 


Albumin


 2.9 G/DL


(3.4-5.0)  L


 


Globulin 4.2 g/dL  


 


Albumin/Globulin Ratio


 0.7 (1.0-2.7)


L


 


HIV (1&2) Antibody Rapid Pending  








Height (Feet):  6


Weight (Pounds):  185


Medications





Current Medications








 Medications


  (Trade)  Dose


 Ordered  Sig/Tameka


 Route


 PRN Reason  Start Time


 Stop Time Status Last Admin


Dose Admin


 


 Acetaminophen


  (Tylenol)  650 mg  Q4H  PRN


 ORAL


 Mild Pain (Pain Scale 1-3)  1/14/21 02:00


 2/13/21 01:59  1/14/21 02:54





 


 Acetaminophen/


 Hydrocodone Bitart


  (Norco 5/325)  1 tab  Q4H  PRN


 ORAL


 Moderate Pain (Pain Scale 4-6)  1/14/21 10:30


 1/21/21 10:29   





 


 Heparin Sodium


  (Porcine)


  (Heparin 5000


 units/ml)  5,000 units  EVERY 12  HOURS


 SUBQ


   1/14/21 09:00


 2/28/21 08:59  1/15/21 09:24





 


 Iohexol


  (OMNIPAQUE-300


 100ml)  100 ml  ONCE  PRN


 INJ


 for radiology  1/14/21 12:15


 1/17/21 23:59   





 


 Morphine Sulfate


  (Morphine


 Sulfate)  2 mg  Q4H  PRN


 IVP


 SEVERE PAIN (7-10)  1/14/21 10:30


 1/21/21 10:29  1/14/21 21:40





 


 Ondansetron HCl


  (Zofran)  4 mg  Q6H  PRN


 IVP


 Nausea & Vomiting  1/14/21 02:00


 2/13/21 01:59   





 


 Piperacillin Sod/


 Tazobactam Sod


 3.375 gm/Sodium


 Chloride  110 ml @ 


 27.5 mls/hr  EVERY 8  HOURS


 IVPB


   1/14/21 06:00


 1/19/21 05:59  1/15/21 14:11





 


 Sodium Chloride  1,000 ml @ 


 100 mls/hr  Q10H


 IV


   1/14/21 03:00


 2/13/21 02:59  1/15/21 09:23





 


 Vancomycin HCl  300 ml @ 


 150 mls/hr  Q12H


 IVPB


   1/14/21 11:00


 1/19/21 10:59  1/15/21 11:29





 


 Vancomycin HCl


  ([facility]


 to dose)  1 ea  DAILY  PRN


 MISC


 Per rx protocol  1/14/21 02:00


 2/13/21 01:59   














Assessment/Plan


Problem List:  


(1) Sepsis


ICD Codes:  A41.9 - Sepsis, unspecified organism


SNOMED:  23484922


(2) Encounter for medication refill


ICD Codes:  Z76.0 - Encounter for issue of repeat prescription


SNOMED:  932522215, 566813565, 439250869


(3) Cellulitis


Assessment & Plan:  40-year-old male with phlegmon carbuncle of the sternal 

notch and neck.  Afebrile hemodynamic stable labs noted.  Patient is fairly 

uncooperative with care plan.  He refuses letting anyone touch the wounds and 

only squeezes himself to drain whenever he can.  Patient initially wanted to 

leave AMA but given his condition was explained in detail his problem and 

decided to come in for evaluation and care plan.  Initially refused antibiotics 

but now is excepting them.  Wound evaluated bedside with patient present after 

was given pain medication.  Wound was washed and identified to be a carbuncle no

abscess noted.  No fluid collection noted.  Currently she has draining from the 

open wound.  Has been present likely for some time now.  CT recommended to eval

uate further.


ICD Codes:  L03.90 - Cellulitis, unspecified


SNOMED:  104952084


(4) Neck pain


ICD Codes:  M54.2 - Cervicalgia


SNOMED:  64605139


(5) Homeless


ICD Codes:  Z59.0 - Homelessness


SNOMED:  47470434


(6) Drug abuse


ICD Codes:  F19.10 - Other psychoactive substance abuse, uncomplicated


SNOMED:  96510804


(7) Abscess


ICD Codes:  L02.91 - Cutaneous abscess, unspecified


SNOMED:  440068287


(8) Bacteremia


ICD Codes:  R78.81 - Bacteremia


SNOMED:  2589635











Mathew Camacho                Gurmeet 15, 2021 16:58

## 2021-01-15 NOTE — PULMONOLOGY PROGRESS NOTE
Subjective


Allergies:  


Coded Allergies:  


     No Known Allergies (Unverified , 11/15/20)


Subjective


no fevers 


labs pending for this am 


seen by ID and surgery started on empiric abx  


reports neck pain


no SOB NO CP





Objective





Last 24 Hour Vital Signs








  Date Time  Temp Pulse Resp B/P (MAP) Pulse Ox O2 Delivery O2 Flow Rate FiO2


 


1/15/21 04:00 97.9 72 20 125/72 (89) 99   


 


1/14/21 22:10 97.7       


 


1/14/21 21:00      Room Air  


 


1/14/21 20:00 97.6 76 20 129/76 (93) 98   


 


1/14/21 12:00 97.7 75 20 131/74 (93) 98   

















Intake and Output  


 


 1/14/21 1/15/21





 19:00 07:00


 


Intake Total 600 ml 650 ml


 


Balance 600 ml 650 ml


 


  


 


Intake Oral 600 ml 650 ml


 


# Voids 3 4








Objective


General Appearance:  WD/WN, no apparent distress


Lines, tubes and drains:  peripheral


HEENT:  normocephalic, atraumatic, anicteric, mucous membranes moist


Neck:  normal alignment, supple


Respiratory/Chest:  lungs clear, no respiratory distress, respiratory distress


Cardiovascular/Chest:  normal peripheral pulses, normal rate


Extremities:  normal range of motion, non-tender


Skin Exam:   - Multiple indurated abscess anterior to trachea, R chest. Non 

fluctuant. No overlying crepitus. No subcutaneous emphysema.


Neurologic:  CNs II-XII grossly normal, no motor/sensory deficits, alert, 

oriented x 3, responsive


Musculoskeletal:  normal muscle bulk





Microbiology








 Date/Time


Source Procedure


Growth Status





 


 1/13/21 23:59


Nasopharynx SARS-CoV-2 RdRp Gene Assay - Final Complete











Current Medications








 Medications


  (Trade)  Dose


 Ordered  Sig/Tameka


 Route


 PRN Reason  Start Time


 Stop Time Status Last Admin


Dose Admin


 


 Acetaminophen


  (Tylenol)  650 mg  Q4H  PRN


 ORAL


 Mild Pain (Pain Scale 1-3)  1/14/21 02:00


 2/13/21 01:59  1/14/21 02:54





 


 Acetaminophen/


 Hydrocodone Bitart


  (Norco 5/325)  1 tab  Q4H  PRN


 ORAL


 Moderate Pain (Pain Scale 4-6)  1/14/21 10:30


 1/21/21 10:29   





 


 Heparin Sodium


  (Porcine)


  (Heparin 5000


 units/ml)  5,000 units  EVERY 12  HOURS


 SUBQ


   1/14/21 09:00


 2/28/21 08:59  1/14/21 08:52





 


 Iohexol


  (OMNIPAQUE-300


 100ml)  100 ml  ONCE  PRN


 INJ


 for radiology  1/14/21 12:15


 1/17/21 23:59   





 


 Morphine Sulfate


  (Morphine


 Sulfate)  2 mg  Q4H  PRN


 IVP


 SEVERE PAIN (7-10)  1/14/21 10:30


 1/21/21 10:29  1/14/21 21:40





 


 Ondansetron HCl


  (Zofran)  4 mg  Q6H  PRN


 IVP


 Nausea & Vomiting  1/14/21 02:00


 2/13/21 01:59   





 


 Piperacillin Sod/


 Tazobactam Sod


 3.375 gm/Sodium


 Chloride  110 ml @ 


 27.5 mls/hr  EVERY 8  HOURS


 IVPB


   1/14/21 06:00


 1/19/21 05:59  1/15/21 05:50





 


 Sodium Chloride  1,000 ml @ 


 100 mls/hr  Q10H


 IV


   1/14/21 03:00


 2/13/21 02:59  1/14/21 21:37





 


 Vancomycin HCl  300 ml @ 


 150 mls/hr  Q12H


 IVPB


   1/14/21 11:00


 1/19/21 10:59  1/14/21 21:37





 


 Vancomycin HCl


  ([facility]


 to dose)  1 ea  DAILY  PRN


 MISC


 Per rx protocol  1/14/21 02:00


 2/13/21 01:59   














Assessment/Plan


Assessment/Plan


ASSESSMENT


Gram negative bacteremia


Recurrent abscess   in patient with polysubstance abuse ( suspected IVDA) 


Hx of MRSA 


Polysubstance abuse 


Homeless  





PLAN OF CARE 


MS floor


CT neck ( w/out contrast only- since pt refused with IV contrast ) noted 


abx per ID


fup with wcx 


BCX + GNB /Sphingomonas


ID  and surgery eval appreciated 


check ESR and CRP  


HIV pending 


pain management 


 on compliance  with medications, care and abstinence from illicit drug 

use


SW consult    





case discussed and evaluated by supervising physician











Kim Mancia NP                Gurmeet 15, 2021 09:17

## 2021-01-15 NOTE — NUR
NURSE NOTES:

Culture swab collected for neck abscess. Dressing changed covered with xeroform and 4X4. 
Moderate thick yellow discharge.

## 2021-01-15 NOTE — NUR
NURSE NOTES:

Received report from Marcia LEE. Patient is in bed sleeping , no SOB noted, on room air. No 
acute distress noted, no facial grimacing. IV patent. Bed in lowest position, call light in 
reach, side rails up x2.

## 2021-01-15 NOTE — NUR
NURSE HAND-OFF: 



Important Events on Shift:[Continued Abx]

Patient Status: [stable]

Diet: [reg]



Pending Orders: []

Pending Results/Labs:[]

Pending MD notification:[]



Latest Vital Signs: Temperature 97.8 , Pulse 88 , B/P 128 /75 , Respiratory Rate 18 , O2 SAT 
96 , Room Air, O2 Flow Rate .  

Vital Sign Comment: []



Latest Mehta Fall Score: 35  

Fall Risk: Medium Risk 

Safety Measures: Call light Within Reach, Bed Alarm Zone 1, Side Rails Side Rails x2, Bed 
position Low and Locked.

Fall Precautions: 

Yellow Socks



Report given to [Socorro RN ].

## 2021-01-15 NOTE — NUR
NURSE HAND-OFF: 



Important Events on Shift: TUBE FEEDING; O2 CARE; RESTRAINTS CARE

Patient Status: STABLE

Diet: TUBE FEEDING



Pending Orders: 

Pending Results/Labs:

Pending MD notification:



Latest Vital Signs: Temperature 97.9 , Pulse 72 , B/P 125 /72 , Respiratory Rate 20 , O2 SAT 
99 , Room Air, O2 Flow Rate .  

Vital Sign Comment: 



Latest Mehta Fall Score: 35  

Fall Risk: Medium Risk 

Safety Measures: Call light Within Reach, Bed Alarm Zone 1, Side Rails Side Rails x2, Bed 
position Low and Locked.

Fall Precautions: 

Yellow Socks


-------------------------------------------------------------------------------

Addendum: 01/15/21 at 0613 by Mela Cruz RN

-------------------------------------------------------------------------------

addendum: wrong entry

## 2021-01-15 NOTE — NUR
NOTE



Pt presents as A&O4x. Pt is homeless, is staying at an abandoned restaurant on [address]. Pt did not provide the exact location. Pt receives no income. Pt denies current 
probation/parole. Pt has hx of meth abuse. Pt reports current THC abuse. Pt declined 
counseling/tx intervention on substance abuse. Pt also denies mental illness. Pt is 
ambulatory w/o DME and independent w/ ADLs. SW and pt discussed possible placement options. 
However, pt refused d/t lack of funding. Pt wants dc own resource to preferred location. SW 
explained negative ramification of unlicensed facilities/self-directing. Pt verbalized 
understanding. PT has appropriate clothing. Pt does not have any  
concern/needs at this time. SW to F/U as needed.  



DCP:own resource to preferred location via public transportation

## 2021-01-15 NOTE — NUR
NURSE HAND-OFF: 



Important Events on Shift: pain mngt

Patient Status: stable

Diet: regular



Pending Orders: 

Pending Results/Labs:

Pending MD notification:



Latest Vital Signs: Temperature 97.9 , Pulse 72 , B/P 125 /72 , Respiratory Rate 20 , O2 SAT 
99 , Room Air, O2 Flow Rate .  

Vital Sign Comment: 



Latest Mehta Fall Score: 35  

Fall Risk: Medium Risk 

Safety Measures: Call light Within Reach, Bed Alarm Zone 1, Side Rails Side Rails x2, Bed 
position Low and Locked.

Fall Precautions: 

Yellow Socks


-------------------------------------------------------------------------------

Addendum: 01/15/21 at 0717 by Mela Cruz RN

-------------------------------------------------------------------------------

HAND-OFF: 

Report given to kelechi mccray.

## 2021-01-15 NOTE — NUR
CASE MANAGEMENT:REVIEW



SI;NECK WOUND INFECTION. SPHINGOMONAS SEPSIS.

97.9   88   20   130/75   96% ON RA

ALT+ 101   ALP+ 130   CRP+ 1.1   ALB- 2.9



IS;ZOSYN IV Q8

VANCOMYCIN IV Q12

HEPARIN SQ Q12

IVF  NS @ 100  ML/HR



*****MED SURG STATUS*****

DCP;PATIENT IS FROM HOME

## 2021-01-16 VITALS — DIASTOLIC BLOOD PRESSURE: 76 MMHG | SYSTOLIC BLOOD PRESSURE: 126 MMHG

## 2021-01-16 VITALS — DIASTOLIC BLOOD PRESSURE: 81 MMHG | SYSTOLIC BLOOD PRESSURE: 121 MMHG

## 2021-01-16 LAB
ADD MANUAL DIFF: NO
ALBUMIN SERPL-MCNC: 3.2 G/DL (ref 3.4–5)
ALBUMIN/GLOB SERPL: 0.9 {RATIO} (ref 1–2.7)
ALP SERPL-CCNC: 128 U/L (ref 46–116)
ALT SERPL-CCNC: 82 U/L (ref 12–78)
ANION GAP SERPL CALC-SCNC: 4 MMOL/L (ref 5–15)
AST SERPL-CCNC: 22 U/L (ref 15–37)
BASOPHILS NFR BLD AUTO: 1.4 % (ref 0–2)
BILIRUB SERPL-MCNC: 0.2 MG/DL (ref 0.2–1)
BUN SERPL-MCNC: 20 MG/DL (ref 7–18)
CALCIUM SERPL-MCNC: 8.9 MG/DL (ref 8.5–10.1)
CHLORIDE SERPL-SCNC: 102 MMOL/L (ref 98–107)
CO2 SERPL-SCNC: 30 MMOL/L (ref 21–32)
CREAT SERPL-MCNC: 0.9 MG/DL (ref 0.55–1.3)
EOSINOPHIL NFR BLD AUTO: 3 % (ref 0–3)
ERYTHROCYTE [DISTWIDTH] IN BLOOD BY AUTOMATED COUNT: 12.9 % (ref 11.6–14.8)
GLOBULIN SER-MCNC: 3.7 G/DL
HCT VFR BLD CALC: 47.4 % (ref 42–52)
HGB BLD-MCNC: 16 G/DL (ref 14.2–18)
LYMPHOCYTES NFR BLD AUTO: 26.3 % (ref 20–45)
MCV RBC AUTO: 89 FL (ref 80–99)
MONOCYTES NFR BLD AUTO: 9.2 % (ref 1–10)
NEUTROPHILS NFR BLD AUTO: 60.2 % (ref 45–75)
PLATELET # BLD: 439 K/UL (ref 150–450)
POTASSIUM SERPL-SCNC: 4.8 MMOL/L (ref 3.5–5.1)
RBC # BLD AUTO: 5.3 M/UL (ref 4.7–6.1)
SODIUM SERPL-SCNC: 136 MMOL/L (ref 136–145)
WBC # BLD AUTO: 9.4 K/UL (ref 4.8–10.8)

## 2021-01-16 RX ADMIN — DEXTROSE MONOHYDRATE SCH MLS/HR: 50 INJECTION, SOLUTION INTRAVENOUS at 03:58

## 2021-01-16 RX ADMIN — Medication SCH MLS/HR: at 11:55

## 2021-01-16 RX ADMIN — MORPHINE SULFATE PRN MG: 2 INJECTION, SOLUTION INTRAMUSCULAR; INTRAVENOUS at 21:21

## 2021-01-16 RX ADMIN — Medication SCH MLS/HR: at 22:40

## 2021-01-16 RX ADMIN — LEVOFLOXACIN SCH MG: 500 TABLET, FILM COATED ORAL at 16:00

## 2021-01-16 RX ADMIN — MORPHINE SULFATE PRN MG: 2 INJECTION, SOLUTION INTRAMUSCULAR; INTRAVENOUS at 15:04

## 2021-01-16 RX ADMIN — HEPARIN SODIUM SCH UNITS: 5000 INJECTION INTRAVENOUS; SUBCUTANEOUS at 09:00

## 2021-01-16 RX ADMIN — MORPHINE SULFATE PRN MG: 2 INJECTION, SOLUTION INTRAMUSCULAR; INTRAVENOUS at 09:32

## 2021-01-16 RX ADMIN — HEPARIN SODIUM SCH UNITS: 5000 INJECTION INTRAVENOUS; SUBCUTANEOUS at 21:00

## 2021-01-16 RX ADMIN — DEXTROSE MONOHYDRATE SCH MLS/HR: 50 INJECTION, SOLUTION INTRAVENOUS at 14:51

## 2021-01-16 RX ADMIN — MORPHINE SULFATE PRN MG: 2 INJECTION, SOLUTION INTRAMUSCULAR; INTRAVENOUS at 02:34

## 2021-01-16 NOTE — NUR
ALEX NOTES:

Received report from jesus ly. patient is on bed,awake. on room air, no sob. iv access on 
the right forearm, with ivf as ordered. per JESUS ly," he refused vitals and medications; 
dressing changed on the neck". denies any pain or discomfort. reiterated to call and ask for 
assistance. call light and light button within easy reach. bed locked and in lowest 
position. will continue plan of care

## 2021-01-16 NOTE — NUR
NURSE NOTES:

Called pipeline for the vanco trough result. per diane [facility], to continue the same dose. 
charge nurse made aware.

## 2021-01-16 NOTE — SURGERY PROGRESS NOTE
Surgery Progress Note


Subjective


Symptoms:  improved, pain same, tolerating diet, voiding well, passing flatus, 

BM





Objective





Last 24 Hour Vital Signs








  Date Time  Temp Pulse Resp B/P (MAP) Pulse Ox O2 Delivery O2 Flow Rate FiO2


 


1/16/21 10:02 98.1       


 


1/16/21 09:00      Room Air  


 


1/16/21 03:04 98.1       


 


1/15/21 21:23      Room Air  


 


1/15/21 20:37 98.1 94 18 117/76 (90) 97   


 


1/15/21 20:36 97.8       


 


1/15/21 16:00 97.8 88 18 128/75 (92) 96   


 


1/15/21 12:00 96.8 88 18 130/75 (93) 96   








I&O











Intake and Output  


 


 1/15/21 1/16/21





 19:00 07:00


 


Intake Total 1470.0 ml 692.5 ml


 


Output Total  200 ml


 


Balance 1470.0 ml 492.5 ml


 


  


 


Intake Oral 960 ml 


 


IV Total 510.0 ml 692.5 ml


 


Output Urine Total  200 ml


 


# Voids 2 2








Dressing:  saturated


Cardiovascular:  RSR


Respiratory:  decreased breath sounds


Abdomen:  non-tender, present bowel sounds, non-distended


Extremities:  no edema, no tenderness, no cyanosis





Plan


Problems:  


(1) Sepsis


(2) Encounter for medication refill


(3) Cellulitis


Assessment & Plan:  There is a raised area of thickening of the subcutaneous fat

in the anterior neck


near the base of the neck. This measures approximately 4.5 cm transverse by 1.2 

cm AP


by 4 cm craniocaudad. Similar finding was demonstrated previously. It appears 

larger


on the current exam, but the apparent size may be affected by differences in


positioning, as on the prior study the patient's neck was extended and on the 

current


exam it is flexed. The attenuation is uniform, soft tissue attenuation. There is


thickening of the surrounding skin, which appears to be greater than on the p

revious


study, as well as some infiltration of the subcutaneous fat extending away from 

the


lesion. There appears to be a small superficial ulceration.


 


The adenoids are mildly prominent. The right lateral tonsillar pillars contain


calcifications and are somewhat prominent. Otherwise unremarkable nasopharynx 

and


hypopharynx and oropharynx. The larynx is unremarkable. The thyroid is 

unremarkable.


No cervical mass or adenopathy. The bilateral parapharyngeal spaces are clear 

and


symmetric. The salivary glands are unremarkable. The included lung apices 

demonstrate


small bullous changes on the right. The included upper mediastinum is un

remarkable.


The optic globes are intact. The mastoids are clear. The bones are intact. 

Lucencies


about the mandibular molars raise concern for apical root abscesses. There is 

also


evidence of dental caries.


 


Impression: Limited exam, due to lack of IV contrast administration


 


Raised area of thickening of the subcutaneous fat in the anterior neck, as 

described,


presumably clinically evident, with suggestion of a small superficial ulcer. 

Most


likely an area of soft tissue cellulitis and ulceration. There is also 

suggestion of


inflammation of soft tissues extending away from this lesion. Although there is 

no


suggestion of abscess on this exam, the presence or absence of an abscess cannot

be


evaluated adequately in the absence of IV contrast. Sonography may be useful to 

see


if there is underlying fluid collection.


 


Mildly prominent adenoids and tonsils, also previously reported


 


Apical bullous changes within the right lung


 


Evidence of dental and periodontal disease





Carbuncle CT identified continue antibiotics and local wound care no acute 

surgical intervention planned at this time.





(4) Neck pain


(5) Homeless


(6) Drug abuse


(7) Abscess


(8) Bacteremia











Mathew Camacho                Jan 16, 2021 11:58

## 2021-01-16 NOTE — NUR
NURSE NOTES:

Handoff received from Stella LEE.  Patient is asleep, no signs of acuite distress noted.  
Breathing is even and unlabored on roomair.  Neck dressing is clean and dry.  IV is intact 
and running IVF as ordered.  Bed is low and locked, side rails up x2, call light is within 
reach.

## 2021-01-16 NOTE — PULMONOLOGY PROGRESS NOTE
Subjective


Allergies:  


Coded Allergies:  


     No Known Allergies (Unverified , 11/15/20)


Subjective


no fevers 


leukocytosis  resolved


c/o neck pain 


no SOB NO CP





Objective





Last 24 Hour Vital Signs








  Date Time  Temp Pulse Resp B/P (MAP) Pulse Ox O2 Delivery O2 Flow Rate FiO2


 


1/16/21 03:04 98.1       


 


1/15/21 21:23      Room Air  


 


1/15/21 20:37 98.1 94 18 117/76 (90) 97   


 


1/15/21 20:36 97.8       


 


1/15/21 16:00 97.8 88 18 128/75 (92) 96   


 


1/15/21 12:00 96.8 88 18 130/75 (93) 96   


 


1/15/21 09:00      Room Air  














l


Intake and Output  


 


 1/15/21 1/16/21





 19:00 07:00


 


Intake Total 1470.0 ml 692.5 ml


 


Output Total  200 ml


 


Balance 1470.0 ml 492.5 ml


 


  


 


Intake Oral 960 ml 


 


IV Total 510.0 ml 692.5 ml


 


Output Urine Total  200 ml


 


# Voids 2 2








Objective


General Appearance:  WD/WN, no apparent distress


Lines, tubes and drains:  peripheral


HEENT:  normocephalic, atraumatic, anicteric, mucous membranes moist


Neck:  normal alignment, supple


Respiratory/Chest:  lungs clear, no respiratory distress, respiratory distress


Cardiovascular/Chest:  normal peripheral pulses, normal rate


Extremities:  normal range of motion, non-tender


Skin Exam:   - Multiple indurated abscess anterior to trachea, R chest. Non 

fluctuant. No overlying crepitus. No subcutaneous emphysema.


Neurologic:  CNs II-XII grossly normal, no motor/sensory deficits, alert, 

oriented x 3, responsive


Musculoskeletal:  normal muscle bulk





Microbiology








 Date/Time


Source Procedure


Growth Status





 


 1/15/21 12:00


Neck Gram Stain - Final Resulted


 


 1/15/21 12:00


Neck Wound Culture


Pending Resulted





 1/14/21 00:14


Other Gram Stain - Final Complete


 


 1/14/21 00:14 Wound Culture - Final


Staphylococcus Aureus - Mrsa Complete


 


 1/13/21 23:59


Nasopharynx SARS-CoV-2 RdRp Gene Assay - Final Complete








Laboratory Tests


1/15/21 09:45: 


White Blood Count 9.2, Red Blood Count 5.44, Hemoglobin 15.8, Hematocrit 49.0, 

Mean Corpuscular Volume 90, Mean Corpuscular Hemoglobin 29.0, Mean Corpuscular 

Hemoglobin Concent 32.1, Red Cell Distribution Width 12.8, Platelet Count 445, 

Mean Platelet Volume 7.2, Neutrophils (%) (Auto) 60.0, Lymphocytes (%) (Auto) 

26.5, Monocytes (%) (Auto) 9.1, Eosinophils (%) (Auto) 2.7, Basophils (%) (Auto)

1.7, Erythrocyte Sedimentation Rate 20H, Sodium Level 136, Potassium Level 4.0, 

Chloride Level 102, Carbon Dioxide Level 28, Anion Gap 6, Blood Urea Nitrogen 

15, Creatinine 0.8, Estimat Glomerular Filtration Rate > 60, Glucose Level 97, 

Calcium Level 8.9, Total Bilirubin 0.3, Aspartate Amino Transf (AST/SGOT) 29, 

Alanine Aminotransferase (ALT/SGPT) 101H, Alkaline Phosphatase 130H, C-Reactive 

Protein, Quantitative 1.1H, Total Protein 7.1, Albumin 2.9L, Globulin 4.2, 

Albumin/Globulin Ratio 0.7L, HIV (1&2) Antibody Rapid [Pending]





Current Medications








 Medications


  (Trade)  Dose


 Ordered  Sig/Tameka


 Route


 PRN Reason  Start Time


 Stop Time Status Last Admin


Dose Admin


 


 Acetaminophen


  (Tylenol)  650 mg  Q4H  PRN


 ORAL


 Mild Pain (Pain Scale 1-3)  1/14/21 02:00


 2/13/21 01:59  1/14/21 02:54





 


 Acetaminophen/


 Hydrocodone Bitart


  (Norco 5/325)  1 tab  Q4H  PRN


 ORAL


 Moderate Pain (Pain Scale 4-6)  1/14/21 10:30


 1/21/21 10:29   





 


 Heparin Sodium


  (Porcine)


  (Heparin 5000


 units/ml)  5,000 units  EVERY 12  HOURS


 SUBQ


   1/14/21 09:00


 2/28/21 08:59  1/15/21 19:47





 


 Iohexol


  (OMNIPAQUE-300


 100ml)  100 ml  ONCE  PRN


 INJ


 for radiology  1/14/21 12:15


 1/17/21 23:59   





 


 Morphine Sulfate


  (Morphine


 Sulfate)  2 mg  Q4H  PRN


 IVP


 SEVERE PAIN (7-10)  1/14/21 10:30


 1/21/21 10:29  1/16/21 02:34





 


 Ondansetron HCl


  (Zofran)  4 mg  Q6H  PRN


 IVP


 Nausea & Vomiting  1/14/21 02:00


 2/13/21 01:59   





 


 Piperacillin Sod/


 Tazobactam Sod


 3.375 gm/Sodium


 Chloride  110 ml @ 


 27.5 mls/hr  EVERY 8  HOURS


 IVPB


   1/14/21 06:00


 1/19/21 05:59  1/16/21 03:58





 


 Sodium Chloride  1,000 ml @ 


 100 mls/hr  Q10H


 IV


   1/14/21 03:00


 2/13/21 02:59  1/15/21 19:01





 


 Vancomycin HCl  300 ml @ 


 150 mls/hr  Q12H


 IVPB


   1/14/21 11:00


 1/19/21 10:59  1/15/21 23:48





 


 Vancomycin HCl


  ([facility]


 to dose)  1 ea  DAILY  PRN


 MISC


 Per rx protocol  1/14/21 02:00


 2/13/21 01:59   














Assessment/Plan


Assessment/Plan


ASSESSMENT


Gram negative bacteremia


Recurrent MRSA abscess   in patient with polysubstance abuse ( suspected IVDA) 


Hx of MRSA 


Polysubstance abuse 


Homeless  





PLAN OF CARE 


MS floor


CT neck ( w/out contrast only- since pt refused with IV contrast ) noted 


abx per ID


WCX 1/14  + MRSA ; WCX 1/15 pending  


BCX + GNB /Sphingomonas


ID  and surgery eval appreciated 


ESR  20 and CRP  1.1 not significant  


HIV pending 


pain management 


 on compliance  with medications, care and abstinence from illicit drug 

use


SW consult 


dc plan on oral abx if OK with ID and surgery     





case discussed and evaluated by supervising physician











Kim Mancia NP                Jan 16, 2021 08:23

## 2021-01-16 NOTE — NUR
NURSE HAND-OFF: 



Important Events on Shift:[dressing change]

Patient Status: stable

Diet: regular



Pending Orders: 

Pending Results/Labs:

Pending MD notification:



Latest Vital Signs: Temperature 98.6 , Pulse 88 , B/P 121 /81 , Respiratory Rate 18 , O2 SAT 
97 , Room Air, O2 Flow Rate .  

Vital Sign Comment: stable



Latest Mehta Fall Score: 35  

Fall Risk: Medium Risk 

Safety Measures: Call light Within Reach, Bed Alarm Zone 1, Side Rails Side Rails x2, Bed 
position Low and Locked.

Fall Precautions: 

Yellow Socks



Report given to Marcia LEE.

## 2021-01-16 NOTE — INFECTIOUS DISEASES PROG NOTE
Assessment/Plan


Assessment/Plan





A


1. Neck wound infection With MRSA


2. Sphingomonas sepsis


3. Homeless





P


1. continue iv vancomycin, 


2. Change Zosyn to Levaquin





Subjective


ROS Limited/Unobtainable:  No


Constitutional:  Reports: no symptoms, other - feels better


Respiratory:  Reports: no symptoms


Gastrointestinal/Abdominal:  Reports: no symptoms


Genitourinary:  Reports: no symptoms


Allergies:  


Coded Allergies:  


     No Known Allergies (Unverified , 11/15/20)





Objective





Last 24 Hour Vital Signs








  Date Time  Temp Pulse Resp B/P (MAP) Pulse Ox O2 Delivery O2 Flow Rate FiO2


 


1/16/21 10:02 98.1       


 


1/16/21 09:00      Room Air  


 


1/16/21 03:04 98.1       


 


1/15/21 21:23      Room Air  


 


1/15/21 20:37 98.1 94 18 117/76 (90) 97   


 


1/15/21 20:36 97.8       


 


1/15/21 16:00 97.8 88 18 128/75 (92) 96   








Height (Feet):  6


Weight (Pounds):  185


General Appearance:  no acute distress


HEENT:  mucous membranes moist, other - no oral lesion


Respiratory/Chest:  lungs clear


Cardiovascular:  normal rate


Abdomen:  soft, non tender


Extremities:  no edema


Skin:  ulcers, other - midline neck, upper chest


Neurologic/Psychiatric:  alert, oriented x 3, responsive





Microbiology








 Date/Time


Source Procedure


Growth Status





 


 1/15/21 12:00


Neck Gram Stain - Final Resulted


 


 1/15/21 12:00


Neck Wound Culture


Pending Resulted





 1/14/21 00:14


Other Gram Stain - Final Complete


 


 1/14/21 00:14 Wound Culture - Final


Staphylococcus Aureus - Mrsa Complete


 


 1/13/21 23:59


Nasopharynx SARS-CoV-2 RdRp Gene Assay - Final Complete











Current Medications








 Medications


  (Trade)  Dose


 Ordered  Sig/Tameka


 Route


 PRN Reason  Start Time


 Stop Time Status Last Admin


Dose Admin


 


 Acetaminophen


  (Tylenol)  650 mg  Q4H  PRN


 ORAL


 Mild Pain (Pain Scale 1-3)  1/14/21 02:00


 2/13/21 01:59  1/14/21 02:54





 


 Acetaminophen/


 Hydrocodone Bitart


  (Norco 5/325)  1 tab  Q4H  PRN


 ORAL


 Moderate Pain (Pain Scale 4-6)  1/14/21 10:30


 1/21/21 10:29   





 


 Heparin Sodium


  (Porcine)


  (Heparin 5000


 units/ml)  5,000 units  EVERY 12  HOURS


 SUBQ


   1/14/21 09:00


 2/28/21 08:59  1/15/21 19:47





 


 Iohexol


  (OMNIPAQUE-300


 100ml)  100 ml  ONCE  PRN


 INJ


 for radiology  1/14/21 12:15


 1/17/21 23:59   





 


 Morphine Sulfate


  (Morphine


 Sulfate)  2 mg  Q4H  PRN


 IVP


 SEVERE PAIN (7-10)  1/14/21 10:30


 1/21/21 10:29  1/16/21 15:04





 


 Ondansetron HCl


  (Zofran)  4 mg  Q6H  PRN


 IVP


 Nausea & Vomiting  1/14/21 02:00


 2/13/21 01:59   





 


 Piperacillin Sod/


 Tazobactam Sod


 3.375 gm/Sodium


 Chloride  110 ml @ 


 27.5 mls/hr  EVERY 8  HOURS


 IVPB


   1/14/21 06:00


 1/19/21 05:59  1/16/21 14:51





 


 Sodium Chloride  1,000 ml @ 


 100 mls/hr  Q10H


 IV


   1/14/21 03:00


 2/13/21 02:59  1/16/21 14:51





 


 Vancomycin HCl  300 ml @ 


 150 mls/hr  Q12H


 IVPB


   1/14/21 11:00


 1/19/21 10:59  1/16/21 11:55





 


 Vancomycin HCl


  ([facility]


 to dose)  1 ea  DAILY  PRN


 MISC


 Per rx protocol  1/14/21 02:00


 2/13/21 01:59   




















Alex Tierney MD               Jan 16, 2021 15:48

## 2021-01-17 VITALS — SYSTOLIC BLOOD PRESSURE: 121 MMHG | DIASTOLIC BLOOD PRESSURE: 73 MMHG

## 2021-01-17 RX ADMIN — Medication SCH MLS/HR: at 06:25

## 2021-01-17 RX ADMIN — HEPARIN SODIUM SCH UNITS: 5000 INJECTION INTRAVENOUS; SUBCUTANEOUS at 09:00

## 2021-01-17 RX ADMIN — MORPHINE SULFATE PRN MG: 2 INJECTION, SOLUTION INTRAMUSCULAR; INTRAVENOUS at 05:45

## 2021-01-17 RX ADMIN — MORPHINE SULFATE PRN MG: 2 INJECTION, SOLUTION INTRAMUSCULAR; INTRAVENOUS at 01:28

## 2021-01-17 RX ADMIN — MORPHINE SULFATE PRN MG: 2 INJECTION, SOLUTION INTRAMUSCULAR; INTRAVENOUS at 09:52

## 2021-01-17 RX ADMIN — LEVOFLOXACIN SCH MG: 500 TABLET, FILM COATED ORAL at 09:52

## 2021-01-17 NOTE — NUR
NURSE HAND-OFF: 



Important Events on Shift: pain mngt,

Patient Status: stable

Diet: regular



Pending Orders: 

Pending Results/Labs:

Pending MD notification:



Latest Vital Signs: Temperature 97.4 , Pulse 80 , B/P 121 /73 , Respiratory Rate 19 , O2 SAT 
97 , Room Air, O2 Flow Rate .  

Vital Sign Comment: 



Latest Mehta Fall Score: 35  

Fall Risk: Medium Risk 

Safety Measures: Call light Within Reach, Bed Alarm Zone 1, Side Rails Side Rails x2, Bed 
position Low and Locked.

Fall Precautions: 

Yellow Socks


-------------------------------------------------------------------------------

Addendum: 01/17/21 at 0726 by Mela Cruz RN

-------------------------------------------------------------------------------

HAND-OFF: 

Report given to kelechi manzano.

## 2021-01-17 NOTE — NUR
NURSE NOTES:

Report received from JESUS Kennedy. Pt awake, alert and oriented x 4, no SOB, bed in lowest 
position with break engaged and alarm on, denies any pain at the moment, IV line present, on 
room air, will continue to monitor and proceed with plan of care, call light within reach.

## 2021-01-17 NOTE — SURGERY PROGRESS NOTE
Surgery Progress Note


Subjective


Symptoms:  improved, tolerating diet, voiding well, passing flatus, BM, pain 

decreased





Objective





Last 24 Hour Vital Signs








  Date Time  Temp Pulse Resp B/P (MAP) Pulse Ox O2 Delivery O2 Flow Rate FiO2


 


1/17/21 10:22 97.4       


 


1/17/21 09:00      Room Air  


 


1/17/21 06:15 97.4       


 


1/17/21 04:00 97.4 80 19 121/73 (89) 97   


 


1/17/21 01:58 98.6       


 


1/16/21 21:51 98.6       


 


1/16/21 21:00      Room Air  


 


1/16/21 20:00 97.8 83 19 126/76 (93) 98   


 


1/16/21 18:47 98.6       


 


1/16/21 15:52 98.6 88 18 121/81 (94) 97   


 


1/16/21 15:34 98.1       








I&O











Intake and Output  


 


 1/16/21 1/17/21





 19:00 07:00


 


Intake Total 750 ml 1400 ml


 


Output Total 900 ml 


 


Balance -150 ml 1400 ml


 


  


 


Intake Oral 750 ml 750 ml


 


IV Total  650 ml


 


Output Urine Total 900 ml 


 


# Voids 3 5








Dressing:  saturated


Wound:  other


Cardiovascular:  RSR


Respiratory:  clear


Abdomen:  soft, flat, non-tender, present bowel sounds


Extremities:  no edema, no tenderness, no cyanosis





Laboratory Tests








Test


 1/16/21


21:50


 


White Blood Count


 9.4 K/UL


(4.8-10.8)


 


Red Blood Count


 5.30 M/UL


(4.70-6.10)


 


Hemoglobin


 16.0 G/DL


(14.2-18.0)


 


Hematocrit


 47.4 %


(42.0-52.0)


 


Mean Corpuscular Volume 89 FL (80-99)  


 


Mean Corpuscular Hemoglobin


 30.2 PG


(27.0-31.0)


 


Mean Corpuscular Hemoglobin


Concent 33.8 G/DL


(32.0-36.0)


 


Red Cell Distribution Width


 12.9 %


(11.6-14.8)


 


Platelet Count


 439 K/UL


(150-450)


 


Mean Platelet Volume


 7.2 FL


(6.5-10.1)


 


Neutrophils (%) (Auto)


 60.2 %


(45.0-75.0)


 


Lymphocytes (%) (Auto)


 26.3 %


(20.0-45.0)


 


Monocytes (%) (Auto)


 9.2 %


(1.0-10.0)


 


Eosinophils (%) (Auto)


 3.0 %


(0.0-3.0)


 


Basophils (%) (Auto)


 1.4 %


(0.0-2.0)


 


Sodium Level


 136 MMOL/L


(136-145)


 


Potassium Level


 4.8 MMOL/L


(3.5-5.1)


 


Chloride Level


 102 MMOL/L


()


 


Carbon Dioxide Level


 30 MMOL/L


(21-32)


 


Anion Gap


 4 mmol/L


(5-15)  L


 


Blood Urea Nitrogen


 20 mg/dL


(7-18)  H


 


Creatinine


 0.9 MG/DL


(0.55-1.30)


 


Estimat Glomerular Filtration


Rate > 60 mL/min


(>60)


 


Glucose Level


 97 MG/DL


()


 


Calcium Level


 8.9 MG/DL


(8.5-10.1)


 


Total Bilirubin


 0.2 MG/DL


(0.2-1.0)


 


Aspartate Amino Transf


(AST/SGOT) 22 U/L (15-37)





 


Alanine Aminotransferase


(ALT/SGPT) 82 U/L (12-78)


H


 


Alkaline Phosphatase


 128 U/L


()  H


 


Total Protein


 6.9 G/DL


(6.4-8.2)


 


Albumin


 3.2 G/DL


(3.4-5.0)  L


 


Globulin 3.7 g/dL  


 


Albumin/Globulin Ratio


 0.9 (1.0-2.7)


L


 


Vancomycin Level Trough


 8.3 ug/mL


(5.0-12.0)











Plan


Problems:  


(1) Sepsis


(2) Encounter for medication refill


(3) Cellulitis


Assessment & Plan:  There is a raised area of thickening of the subcutaneous fat

in the anterior neck


near the base of the neck. This measures approximately 4.5 cm transverse by 1.2 

cm AP


by 4 cm craniocaudad. Similar finding was demonstrated previously. It appears 

larger


on the current exam, but the apparent size may be affected by differences in


positioning, as on the prior study the patient's neck was extended and on the 

current


exam it is flexed. The attenuation is uniform, soft tissue attenuation. There is


thickening of the surrounding skin, which appears to be greater than on the 

previous


study, as well as some infiltration of the subcutaneous fat extending away from 

the


lesion. There appears to be a small superficial ulceration.


 


The adenoids are mildly prominent. The right lateral tonsillar pillars contain


calcifications and are somewhat prominent. Otherwise unremarkable nasopharynx 

and


hypopharynx and oropharynx. The larynx is unremarkable. The thyroid is 

unremarkable.


No cervical mass or adenopathy. The bilateral parapharyngeal spaces are clear 

and


symmetric. The salivary glands are unremarkable. The included lung apices 

demonstrate


small bullous changes on the right. The included upper mediastinum is 

unremarkable.


The optic globes are intact. The mastoids are clear. The bones are intact. 

Lucencies


about the mandibular molars raise concern for apical root abscesses. There is 

also


evidence of dental caries.


 


Impression: Limited exam, due to lack of IV contrast administration


 


Raised area of thickening of the subcutaneous fat in the anterior neck, as 

described,


presumably clinically evident, with suggestion of a small superficial ulcer. 

Most


likely an area of soft tissue cellulitis and ulceration. There is also 

suggestion of


inflammation of soft tissues extending away from this lesion. Although there is 

no


suggestion of abscess on this exam, the presence or absence of an abscess cannot

be


evaluated adequately in the absence of IV contrast. Sonography may be useful to 

see


if there is underlying fluid collection.


 


Mildly prominent adenoids and tonsils, also previously reported


 


Apical bullous changes within the right lung


 


Evidence of dental and periodontal disease





Carbuncle CT identified continue antibiotics and local wound care no acute 

surgical intervention planned at this time.





(4) Neck pain


(5) Homeless


(6) Drug abuse


(7) Abscess


(8) Bacteremia











Mathew Camacho                Jan 17, 2021 13:52

## 2021-01-17 NOTE — NUR
NURSE NOTES:

Patient stated he wanted to be discharged, explained to pt that MD has not put in an 
official discharge order as of yet and explained the risks of leaving AMA. Pt then got upset 
and restless and he stated he wanted to leave ASAP and that we would sign AMA. Pt was given 
all his belongings, IV line and ID band were removed, also given the ATB PO he was admitted 
with. Patient signed AMA form and left at 1350 in stable condition. Kim Mancia NP aware. No 
family member listed.

## 2021-01-18 NOTE — DISCHARGE SUMMARY
Discharge Summary


Discharge Summary


_


Date of admission: 1/14/2021





Date of discharge: 1/17/2021





Patient left AGAINST MEDICAL ADVICE





History of Present Illness and Brief Hospital Course


Mr. Mcqueen is a 40-year-old male with history of methamphetamine abuse, 

emphysema, homelessness, and recent history of abscess to his chest and neck a

zoraida who presented to the ED.  Of note, he recently presented to the ED for 

recurrent left neck and anterior chest abscess.  Patient was noted to be 

admitted on November for sepsis secondary to abscess and cellulitis.  Patient 

was previously treated with doxycycline x10 days.  However he reported that when

he completed his doxycycline, the abscesses got worse.





Patient tested negative for COVID-19 via a rapid gene assay.  He was admitted 

for further management and care.





A raised area of thickening of the subcutaneous fat in the anterior neck near 

the base of the neck was identified.  Similar finding was demonstrated 

previously. His CT of neck without contrast demonstrated raised area of 

thickening of the subcutaneous fat in the anterior neck and an area of soft 

tissue cellulitis and ulceration.





Of note, the wound culture grew MRSA in the past.  He was started on empiric IV 

vancomycin and Zosyn.  His neck wound culture grew MRSA again.  IV vancomycin 

was continued and Zosyn was switched to Levaquin.





However, on 1/17/2021, patient stated that he wanted to be discharged.  Patient 

was explained the risks of leaving AMA.  Patient signed AMA form and left at 

1315 in stable condition.





Consultants:


Surgery Dr. Camacho


Infectious disease Dr. Tierney





Discharge Condition


Stable at the time of discharge





Final diagnoses


Sphingomyelin as bacteremia


Recurrent MRSA abscess in a patient with polysubstance abuse


History of MRSA


Homelessness 


Cllulitis


Sepsis


Drug abuse





I have been assigned to dictate discharge summary for this account.


I was not involved in the patient's management











Demario Spence                 Jan 18, 2021 15:04